# Patient Record
Sex: FEMALE | Race: WHITE | Employment: FULL TIME | URBAN - METROPOLITAN AREA
[De-identification: names, ages, dates, MRNs, and addresses within clinical notes are randomized per-mention and may not be internally consistent; named-entity substitution may affect disease eponyms.]

---

## 2017-01-25 ENCOUNTER — PRE VISIT (OUTPATIENT)
Dept: CARDIOLOGY | Facility: CLINIC | Age: 57
End: 2017-01-25

## 2017-01-25 ENCOUNTER — CARE COORDINATION (OUTPATIENT)
Dept: ONCOLOGY | Facility: CLINIC | Age: 57
End: 2017-01-25

## 2017-01-25 ENCOUNTER — ONCOLOGY VISIT (OUTPATIENT)
Dept: ONCOLOGY | Facility: CLINIC | Age: 57
End: 2017-01-25
Attending: INTERNAL MEDICINE
Payer: MEDICAID

## 2017-01-25 VITALS
HEART RATE: 63 BPM | SYSTOLIC BLOOD PRESSURE: 122 MMHG | TEMPERATURE: 97.8 F | OXYGEN SATURATION: 96 % | DIASTOLIC BLOOD PRESSURE: 68 MMHG | BODY MASS INDEX: 30.3 KG/M2 | HEIGHT: 64 IN | WEIGHT: 177.5 LBS

## 2017-01-25 DIAGNOSIS — R53.82 CHRONIC FATIGUE: Primary | ICD-10-CM

## 2017-01-25 PROCEDURE — 99354 ZZC PROLONGED SERV,OFFICE,1ST HR: CPT | Mod: ZP | Performed by: INTERNAL MEDICINE

## 2017-01-25 PROCEDURE — 99205 OFFICE O/P NEW HI 60 MIN: CPT | Mod: ZP | Performed by: INTERNAL MEDICINE

## 2017-01-25 RX ORDER — CETIRIZINE HYDROCHLORIDE 10 MG/1
TABLET ORAL
COMMUNITY
End: 2017-01-25

## 2017-01-25 RX ORDER — TIZANIDINE HYDROCHLORIDE 2 MG/1
CAPSULE, GELATIN COATED ORAL
COMMUNITY
Start: 2015-01-01 | End: 2017-01-25

## 2017-01-25 RX ORDER — FLUOXETINE 20 MG/1
60 TABLET, FILM COATED ORAL DAILY
COMMUNITY

## 2017-01-25 RX ORDER — ACYCLOVIR 400 MG/1
400 TABLET ORAL
COMMUNITY
Start: 2016-07-08 | End: 2017-01-25

## 2017-01-25 RX ORDER — CETIRIZINE HYDROCHLORIDE 10 MG/1
10 TABLET ORAL PRN
Qty: 30 TABLET | Refills: 0 | COMMUNITY
Start: 2017-01-25

## 2017-01-25 RX ORDER — AMOXICILLIN 250 MG
1-4 CAPSULE ORAL
COMMUNITY
Start: 2014-07-30

## 2017-01-25 RX ORDER — METHOCARBAMOL 750 MG/1
750 TABLET, FILM COATED ORAL
COMMUNITY
Start: 2014-07-30 | End: 2017-01-25

## 2017-01-25 RX ORDER — ACYCLOVIR 400 MG/1
400 TABLET ORAL PRN
Qty: 30 TABLET | Refills: 0 | COMMUNITY
Start: 2017-01-25

## 2017-01-25 RX ORDER — IBUPROFEN 200 MG
800 TABLET ORAL 2 TIMES DAILY
Qty: 100 TABLET | Refills: 0 | COMMUNITY
Start: 2017-01-25

## 2017-01-25 RX ORDER — PSEUDOEPHEDRINE HCL 30 MG
TABLET ORAL
COMMUNITY
End: 2017-01-25

## 2017-01-25 RX ORDER — PSEUDOEPHEDRINE HCL 30 MG
60 TABLET ORAL 2 TIMES DAILY
Qty: 112 TABLET | Refills: 0 | COMMUNITY
Start: 2017-01-25

## 2017-01-25 RX ORDER — IBUPROFEN 200 MG
TABLET ORAL
COMMUNITY
Start: 2000-01-01 | End: 2017-01-25

## 2017-01-25 RX ORDER — TIZANIDINE HYDROCHLORIDE 2 MG/1
2 CAPSULE, GELATIN COATED ORAL AT BEDTIME
Qty: 90 CAPSULE | Refills: 0 | COMMUNITY
Start: 2017-01-25

## 2017-01-25 ASSESSMENT — ENCOUNTER SYMPTOMS
WEIGHT LOSS: 0
HEMOPTYSIS: 0
DECREASED APPETITE: 0
EYE REDNESS: 0
PANIC: 0
CONSTIPATION: 1
JAUNDICE: 0
FATIGUE: 1
NIGHT SWEATS: 0
STIFFNESS: 1
NUMBNESS: 1
TINGLING: 1
BACK PAIN: 1
ARTHRALGIAS: 1
LEG PAIN: 1
BLOOD IN STOOL: 0
RESPIRATORY PAIN: 0
INCREASED ENERGY: 1
POLYPHAGIA: 0
MUSCLE CRAMPS: 0
ALTERED TEMPERATURE REGULATION: 1
TASTE DISTURBANCE: 0
CLAUDICATION: 0
PARALYSIS: 0
SHORTNESS OF BREATH: 1
INSOMNIA: 1
SINUS CONGESTION: 1
TACHYCARDIA: 1
CHILLS: 1
DECREASED CONCENTRATION: 1
SPEECH CHANGE: 0
NAUSEA: 1
DISTURBANCES IN COORDINATION: 1
HOARSE VOICE: 1
SORE THROAT: 0
EYE WATERING: 0
SKIN CHANGES: 1
HYPERTENSION: 0
SINUS PAIN: 1
NERVOUS/ANXIOUS: 1
COUGH: 0
MEMORY LOSS: 1
POSTURAL DYSPNEA: 0
BOWEL INCONTINENCE: 0
HOT FLASHES: 0
HEARTBURN: 1
SLEEP DISTURBANCES DUE TO BREATHING: 0
HEMATURIA: 0
SNORES LOUDLY: 1
POOR WOUND HEALING: 0
LIGHT-HEADEDNESS: 1
WHEEZING: 0
EXERCISE INTOLERANCE: 1
FLANK PAIN: 0
WEIGHT GAIN: 1
HYPOTENSION: 1
EYE IRRITATION: 1
SMELL DISTURBANCE: 0
RECTAL PAIN: 0
DECREASED LIBIDO: 1
SPUTUM PRODUCTION: 0
ABDOMINAL PAIN: 1
LEG SWELLING: 0
PALPITATIONS: 1
DIFFICULTY URINATING: 1
HEADACHES: 1
SEIZURES: 0
COUGH DISTURBING SLEEP: 0
NECK PAIN: 1
BLOATING: 1
JOINT SWELLING: 0
VOMITING: 1
DYSPNEA ON EXERTION: 1
DIZZINESS: 1
NECK MASS: 0
MYALGIAS: 1
HALLUCINATIONS: 0
TROUBLE SWALLOWING: 1
DYSURIA: 0
MUSCLE WEAKNESS: 1
DIARRHEA: 1
WEAKNESS: 1
LOSS OF CONSCIOUSNESS: 0
DEPRESSION: 1
TREMORS: 0
ORTHOPNEA: 0
RECTAL BLEEDING: 0
FEVER: 0
DOUBLE VISION: 1
EYE PAIN: 1
POLYDIPSIA: 0
SYNCOPE: 0

## 2017-01-25 ASSESSMENT — PAIN SCALES - GENERAL: PAINLEVEL: MODERATE PAIN (4)

## 2017-01-25 NOTE — PROGRESS NOTES
"Patient: Laverne Irvin (MRN [id number])   Encounter Date: 2017  Referring: Lucia Christianson M.D. ([address] [address], [phone], fax [phone])  Attending: Spencer Jennings M.D.     Chief Complaint/Reason for Referral: Second opinion regarding possible mast cell activation disease (MCAD).    History of Present Illness: This 56 year old  white A1 medically disabled (on SSDI since 7/15)  is kindly referred in consultation regarding the above-noted issue.  At the beginning of the encounter, I reviewed all available chart data, consisting principally of about 30 notes and a handful of test results in her electronic chart here dating back to , the salient points of which I've incorporated into the narrative below.  The history here is a bit complex, and it's probably best to just present it all chronologically, blending HPI and PMH as follows.  I should note here at the outset, too, that the patient obviously was afflicted by a good bit of cognitive dysfunction and was having a fair bit of trouble with her remote and recent memory.  She kept apologizing for having such difficulty recalling and recounting her history.  She provided a rather \"scattershot\" history, and I've done my best here to reconstruct it into chronological order, but it's possible I might be missing some significant events or have some events out of order.  Her history of chronic multisystem polymorbidity of general themes of inflammation, allergy, and aberrant growth and development in various tissues actually date back to her earliest memories, recalling that she has never been able to sweat and thus can't bask in the sun lest she quickly get \"overheated.\"  She also recalls she has \"always\" (again, dating to her earlier memories) suffered an array of allergies, requiring special soaps and being intolerant " "of make-up and jewelry and a wide assortment of fragrances and chemical odors.  She doesn't recall any other health issues from childhood but knows that migraines started at 13, and when menarche came at 14, she was immediately beset by \"horrible\" dysmenorrhea and menorrhagia, not uncommonly even causing incidents of helga syncope, all of which continued until her first pregnancy.  She also recalls other problems beginning in early to mid adolescence including an unusual extent of exercise-induced fatigue, many episodes of bronchitis (since about 15) and sinusitis (since about 16).  She says she was \"constantly\" tachycardic since some point in adolescence, and an assortment of medications tried for this never helped much.  She also recalls she has been constipated \"forever,\" though in more recent years this has alternated with diarrhea.  She also recalls suffering gastroesophageal reflux disease for many years and simply cannot remember how far back this began.  She knows she suffered an episode of \"toxic shock syndrome\" in high school.  Her first pregnancy came at 27, notable only for prolonged labor and her  daughter having a heart condition of some sort.  Her second pregnancy came at 32 and went smoothly.  Her third pregnancy came at 33 and was miscarried early; no cause was sought or identified.  Her fourth (and final) pregnancy came at 36 and resulted in placenta previa requiring bedrest for many weeks with delivery by emergency  at 30 weeks, and then the  wound dehisced and had to heal by secondary intention.  Soon after this last pregnancy she began developing neck pain, and at 39 a Chiari malformation was diagnosed as the cause of this.  She underwent decompression surgery which did help initially -- she says it was a \"lifesaver,\" as previously she had been \"crippled\" by the pain -- but over time symptoms have slowly relapsed.  Bladder surgery of some sort (possibly a sling?) came " "around age 41, followed by total abdominal hysterectomy and bilateral salpingo-oophorectomy at 47 and resection of a large thigh lipoma a month later.  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  She was seen for this by Dr. Lucia Christianson, who did diagnose EDS3; genetic testing for other forms of EDS and other connective tissue disorders was all negative.  Dr. Christianson also came to suspect the patient might have a mast cell activation syndrome, leading to the present evaluation.  Unfortunately, she did not receive the letter we usually send to patients ahead of their appointments advising cessation of NSAIDs/salicylates and PPIs if possible, and the patient actually has continued taking her regular NSAID and PPI medications right through to this morning.  She says her chief complaint is diffusely migratory pain.    On a full review of systems, although she denies any issues with flushing, epistaxis, easy bleeding, irritation of the nose or mouth or throat, or adenopathy, she endorses a wide range of other, chronic/recurrent, episodic/intermittent and/or waxing/waning issues including subjective (rarely objective) fevers, feeling cold much of the time, constant marked fatigue (often to the point of exhaustion), malaise, headaches, diffusely migratory aching/pain, diffusely migratory pruritus, lifelong anhidrosis, unprovoked fluctuations in weight and appetite, irritation of the eyes, frequent acute brief inability to focus vision, slight hearing loss, tinnitus, easy bruising, \"constant\" sinonasal congestion, occasional coryza, occasional post-nasal drip, occasional proximal dysphagia, dyspnea, occasional palpitations, non-anginal central chest discomfort/pain, gastroesophageal reflux, occasional nausea, rare vomiting, diarrhea alternating with constipation, diffusely migratory " "abdominal discomfort including (usually post-prandial) bloating, urinary frequency and retention, diffusely migratory (but principally just left-sided) weakness, edema largely limited to the left face, diffusely migratory tingling/numbness (typically about the distal extremities), orthostatic and non-orthostatic presyncope, syncope in past as noted above, cognitive dysfunction (particularly memory, concentration, and word-finding), hair loss, deterioration of dentition despite good attention to dental hygiene, brittle nails, diffusely migratory rashes (typically patchy macular erythema), hives (principally just in reaction to certain medications), insomnia, early waking, anxiety, depression, diffuse joint hypermobility, and poor healing in general.  Complete ROS o/w neg.    Family history is notable for a father with chronic obstructive pulmonary disease (he had smoked a long time), atrial fibrillation, congestive heart failure, \"non-stop\" itching (especially about his back, where there also are \"lots of moles\"), easy bruising, \"lots of hernias\" including a hiatal hernia, S/P CABG, bladder cancer, and gastroesophageal reflux disease, a mother with chronic bronchitis and sinusitis, episodes of pneumonia, S/P myocardial infarction, episodes in which her \"throat closes,\" vocal cord polyps (she, too, is a smoker), three strokes, diffuse joint hypermobility, anxiety, tremor, and poor healing, two daughters with Chiari malformation which has been surgically decompressed, a ventricular septal defect in her oldest daughter which had spontaneously closed by age 2, EDS3 and dysmenorrhea and menorraghia in all three of her daughters, spells of anxiety, tremor, dyspnea, and palpitations in her youngest child who also suffers migraines, \"eye convergence issues,\" \"lots of dental problems,\" and who has had some concussions, skeletal injuries and \"never-ending ankle issues\" in her middle daughter, and a brother with HIV. The " "patient has smoked up to 2 packs of cigarettes a day from 18 to the present, but she denies any history of significant alcohol use (in fact, just a slight bit causes nausea and presyncope) or illegal substance use except for some experimentation in college plus frequent marijuana use which, most interestingly, \"helepd me get through high school.\"  She notes she has been referred for a trial of medical marijuana.    She lists her current medications as Flonase prn, Senokot-S prn, fluoxetine 60 mg qd, ibuprofen 800 mg bid, omeprazole 20 mg qd, tizanidine 2 mg qhs, acyclovir 400 mg prn, cetirizine 10 mg prn allergies, vitamin D 1000 units qd, Sudafed 60 mg bid, and Percocet prn.  She notes Dr. Christianson previously had empirically tried her on Zyrtec and Zantac bid, but this did not appear to help any of her symptoms, and she stopped taking them regularly quite some time ago.  She lists her current allergies as severe depression to Chantix, hives to penicillins, itching to codeine, fatigue to Cymbalta, gabapentin, and Lyrica, itching to morphine, nightmares to topiramate, emotional lability to tramadol, and rash to erythromycin.    Exam finds a mildly overweight woman appearing her stated age and in no acute distress, obvious smoker's voice but no odor of smoke about her at the moment, pleasant, cooperative, fully alert and oriented, independently (if just a bit slowly/achily) ambulatory, presenting by herself.  Vitals per chart, notable solely for weight 178 pounds.  Key findings are her comfortable general appearance, HEENT benign but for fair dentition (and, as I often see in EDS3, she can open her mouth to an unusually wide degree), no plethora/pallor/diaphoresis/jaundice/rash/bleeding/bruising, neck supple, no JVD or thyromegaly or carotid bruits, no palpable adenopathy or tenderness at any of the usual node-bearing sites, clear lungs, regular heart (though with curiously soft heart sounds) with no adventitious sounds, " abdomen a bit overweight but otherwise benign, a bit of tenderness on palpation over the mid-thoracic spine, no peripheral edema, neuro grossly intact but for her report of chronic tingling/numbness in the distal left extremities.  On a light scratch test on the upper back, a remarkably minimal degree (compared to what I usually see in MCAD/MCAS) of dermatographism (erythroderma only, no hives) emerged but was fully sustained when last checked 10 minutes later.      Review of available lab data was notable for routine blood counts and chemistries (except for mild hypoproteinemia and hypoalbuminemia) and urinalysis in '15.  Interestingly, the pathology on the hysterectomy noted inflammation at the cervix.  The patient says she had a colonoscopy about five years ago at some facility in the [facility] and knows that some sort of mild abnormality was found, but she doesn't know whether any biopsies were obtained.    A/P: Kudos to Dr. Christianson, because I think she's likely right in her suspicion that a mast cell activation disorder (MCAD) -- and far more likely the far more prevalent (if only recently recognized) type termed mast cell activation syndrome (MCAS) than the rare (but long recognized) type termed mastocytosis -- is at the root of most or all of the patient's chronic multisystem polymorbidity of general themes of inflammation, allergy, and aberrant growth and development in various tissues (though obviously the COPD she has to have to some degree at this point is mostly or all due to her smoking, and it's imperative she stop smoking if for no other reason than that it's a potent mast cell activator). Beyond all the other diseases already ruled out by the great extent of testing she's undergone to date, I don't know of any other human disease that comes anywhere near as close as MCAS does to accounting, directly or indirectly, for the full range and chronicity of all the symptoms and findings here. (Of note,  too, I'm not saying that any of her prior diagnoses are wrong. It's just that each of them accounts for only one subset or another of all that's gone on in her, while MCAS can account for most or all of everything that's been going on in her.) All of that said, she needs objective laboratory evidence of improperly behaving mast cells, toward which end I ordered the range of testing I typically check in assessing for MCAS, namely, a CBCD, CMP, magnesium, chilled serum tryptase and chromogranin A, chilled plasma prostaglandin D2 and histamine and heparin, chilled random urinary prostaglandin D2 and N-methylhistamine and leukotriene E4 and 2,3-dinor 11-beta-prostaglandin-F2-alpha, and chilled 24-hour urinary prostaglandin D2 and N-methylhistamine and leukotriene E4 and 2,3-dinor 11-beta-prostaglandin-F2-alpha. I also ordered an anti-IgE IgG and an anti-IgE receptor antibody, which won't be useful diagnostically but may influence certain therapeutic decisions down the road if MCAS is proven. However, since she has been using her ibuprofen and omeprazole up to this morning, I asked her to stop these drugs (and any other NSAIDs, salicylates, and PPIs) if possible and return in a week to provide blood and urine samples.  We provided her careful written and verbal instructions regarding the 24-hour urine collection including the importance of continuous chilling of the specimen throughout collection and transport.  She understands the various reasons why a single round of  testing might yield all negative results, and she understands that if this happens, it of course doesn't even begin to invalidate/refute/negate even a single element of her history (which strongly argues for a mast cell disorder). Her history is what it is, so if we get a negative round of testing, I'll simply recommend repeat testing (which she'll of course need to pursue locally), albeit with specimen collection at that point preferably deferred  "to a particularly symptomatic day. I also asked her to work with our clinic nurse coordinator, Kayleen Carrington RN, to try to arrange for her old colonic biopsies (if any) to be reassessed (either locally or here) with  staining (and we'll do the same here for the resected cervical tissue which showed inflammation), as mast cells can't be seen with routine H&E staining and I've often seen \"normal\" or \"mildly chronically inflamed\" GI tract biopsies in MCAS patients \"light up\" on  staining revealing mast cell disease. If 2-3 rounds of blood and urine testing yields all negative results, and if restaining of any available old biopsies either can't be done for some reason or yields negative results, the \"backup plan\" will be to pursue a fresh set of bidirectional endoscopies to obtain biopsies throughout the luminal GI tract for pathologic evaluation specifically (using  staining at a minimum) for increased (>20/hpf) numbers of mast cells (as often seen mildly-moderately in MCAS, though not to anywhere near the degree (or patterns) seen in mastocytosis).    Although I cautioned her that she doesn't have a definitive diagnosis of MCAS yet, we did engage in extended discussion today about general aspects of MCAS including its essential nature of chronic multisystem polymorbidity of a generally inflammatory theme, the availability of many therapies shown helpful in various MCAD/MCAS patients, the good likelihood of (eventually) finding therapy that will help her achieve the goal of feeling significantly better than the pre-treatment baseline the majority of the time, and the present frustrating absence of any biomarkers that can help predict which of the many available, potentially helpful therapies is most likely to benefit the individual patient. She understands that if we are able to secure a diagnosis of MCAS, she will be dependent on pursuing -- in patient, persistent, and methodical fashion, " "trying as hard as possible to make just one change at a time -- trials of the various therapies (which, again, lacking predictive biomarkers, we generally pursue in order of escalating cost). She understands that once all test results are available about a month from now, I will write an addendum to this note (to again be distributed to all of her physicians listed below) providing my interpretation of the results and recommendations for next steps.    Given that she did not appear to be in any particular distress and seemed to be somewhat nonchalant regarding her chronic pain (probably because of just how chronic a problem it is and the fact that her ibuprofen and other analgesics currently keep it at a manageable/tolerable level), and given that she appears to have already failed a trial of at least one combination of H1 and H2 antihistamines bid, I did not recommend any other empiric therapy for her today, though if we do confirm the diagnosis, the first order of therapeutic business here will be to have her try the other non-sedating H1 blockers as well as at least one other H2 blocker.  Some patients find that alternative H1 or H2 blockers serve them better, some patients find that tid dosing serves them better than bid dosing, and some patients find that slightly higher dosages (e.g., 20-30 mg rather than 10 mg of cetirizine, or 150 mg rather than 75 mg of ranitidine) serve them better than lower dosages.  Eventually, she will need to \"experiment,\" taking 2-4 weeks with each specific combination of drug/dose/frequency to understand what it can accomplish for her in controlling this disease of naturally waxing/waning intensity. If we find she has MCAS, we will also need to caution her that MCAD/MCAS patients have quite a predilection to adversely react not necessarily to the active ingredients in their medications but rather to the excipients (fillers, binders, dyes, preservatives, etc.) in their medications. " "As such, if she experiences an adverse reaction very shortly after beginning an ordinarily well tolerated medication (as is certainly the case with the H1 and H2 blockers), excipient reactivity must be suspected and she should promptly work with her pharmacist to review the medication's ingredient list, try to identify the likely offending ingredient, and try one or more alternative formulations of the medication which don't share any of the offending formulation's excipients.    It will be difficult (given that my schedule is now 100% booked for the next year) for me to see her back once the test results are back in a month or so, let alone to manage her through the \"tactical maneuvers\" of trying various medications to gain better control over the disease.  I am aware that Dr. Christianson has been acquiring a good degree of experience and facility with managing MCAS patients through trials of the various therapies known to help them, so we will plan on having the patient principally follow with Dr. Christianson for these \"tactical\" medication trials once a definitive diagnosis has been established.  The patient will next return here in about 2-3 months to discuss her test results and general notions of the disease and the approach to treatment, but I noted to her that since I expect we'll have test results in about 4-5 weeks and I'll send my addendum back to Dr. Christianson at that time, it's quite possible she may already have started treatment with Dr. Christianson by the time she next sees me.  I anticipate that after her next visit here, I'll be seeing the patient roughly annually for \"strategic\" reassessments.    As is usually the case with initial consultations for mast cell disease, this was a long encounter, 100 minutes in total, with 60 minutes spent in counseling. The patient indicated that all of her questions at this time had been answered to her satisfaction, and she expressed appreciation for the time I had given " "her.      Typed, reviewed, and electronically signed by: Spencer Jennings M.D.     DT: 01/25/2017 09:08 PM    cc: Lucia Christianson M.D. ([facility], [address] [place] [id number], [phone], fax [phone])      Addendum (5/20/17): For reasons not clear to me, the patient still has not pursued the diagnostic blood and urine testing for MCAS I had ordered at her initial visit here four months ago.  However, I did receive this past week the results of  staining of some of her old biopsies, and they indeed suggest we're headed in the right direction.    Although the  staining of a hepatic flexure colon polyp obtained in 12/11 (originally read as a tubular adenoma) was unremarkable (10-15 mast cells per high power field; the upper limit of normal in the GI and  tracts for this parameter is considered by most pathologists, in my experience, to be 20/hpf), the  staining of the \"cervix with inflammation and reactive changes\" seen in the hysterectomy specimen from 4/08 showed up to 55 mast cells per high power field.    However, it's not called a mast cell *activation* syndrome for nothing, and I think it will be important to find at least some evidence (i.e., elevated levels in blood and/or urine of mediators relatively specific to the mast cell) of mast cell activation to truly clinch the expected diagnosis here of MCAS.    I will ask my clinic nurse coordinator, Kayleen Carrington RN, to contact the patient to discuss these findings.  Perhaps she has undergone the desired testing elsewhere and already has diagnostic results, but if not, then the testing previously ordered here should still be pursued (again, taking care to attend to all of the previously noted precautions).    I will be happy to write and distribute another addendum once results from the desired tests are all available.    I spent another 20 minutes reviewing " "the above-noted results and writing this addendum and messaging Ms. Carrington, but as none of these activities involved face-to-face time with the patient, no additional billing was submitted.      Typed, reviewed, and electronically signed by: Spencer Jennings M.D.     DT: 05/20/2017 01:25 PM    cc: Lucia Christianson M.D. ([address] [address], [phone], fax [phone])      Addendum (6/26/17): Kudos to Dr. Christianson, as she has been proven right: labs are back and are handily diagnostic.  As is commonly seen in this work-up which necessarily casts a fairly wide net over the range of clinically testable mediators which are relatively specific to the mast cell, most of the tests were normal (or virtually so), but the multiple mast cell  levels detailed below were positive.      Therefore, based on (1) a clinical history highly consistent with chronic/recurrent aberrant mast cell  release, (2) multiple elevated mast cell  levels in 6/17 (random urinary leukotriene E4 163 pg/mg Cr (normal <= 104), 24-hour urinary 2,3-dinor-11-beta-prostaglandin-F2-alpha 5463 pg/mg Cr (normal <= 5205; note, too, the random urinary assessment of this metabolite was *almost* elevated at 5200), 24-hour urinary prostaglandin D2 330 ng/24h (normal 100-280)) (but a normal serum tryptase, largely ruling out systemic mastocytosis), (3) increased mast cells (up to 55/hpf, normal generally thought to be <20) on  staining of the \"cervix with inflammation and reactive changes\" seen in the hysterectomy specimen from 4/08 showed up to 55 mast cells per high power field, (4) absence of any other evident disease better accounting for the full range and chronicity of all the symptoms and findings in the case, and (5) at least partial response to mast-cell-targeted therapy (e.g., antihistamines, NSAIDs, vitamin D), mast cell " "activation syndrome (MCAS; not systemic mastocytosis) is the underlying, unifying diagnosis (per Shantel et al., J Hematol Oncol 2011) for the patient's virtually lifelong complex of multisystem polymorbidity of general themes of inflammation, allergy, and aberrant tissue growth/development.  Of note, again, I don't think any of her prior diagnoses are wrong (other than any assertions of psychosomatism that might have been made along the way), but the diagnosis that seems to best underlie/unify the largest portion (potentially even all) of her large array of past and present issues is MCAS, and therefore treatment efforts directed at such would seem to be warranted.  To be sure, all of her physicians must maintain vigilance for other processes for which -- again, whether ultimately dependent on, or completely independent of, her MCAS -- standard therapies other than mast-cell-directed therapy have been defined, as such standard therapies would of course be the more appropriate choices for such processes.  However, with MCAS now having been defined in this patient per published diagnostic criteria, emergence of a new process that is potentially consistent with MCAS can be reasonably attributed to MCAS once other \"routine\" evaluation for that process has ruled out other, \"traditional\" causes for such a process.      I'd like to briefly address just a bit more why this is MCAS and not mastocytosis. 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 Her normal tryptase, too, is far more consistent with MCAS and makes systemic " "mastocytosis (SM) quite unlikely (not impossible, but quite unlikely). The rare normal-tryptase (or minimally-elevated-tryptase) SM virtually always is the indolent form of SM (ISM), and to the best of our present knowledge, there's no difference in the prognosis of, or the therapeutic approach toward, ISM vs. MCAS. Therefore, even if we're missing the uncommon normal-tryptase ISM by not pursuing more biopsies of various extracutaneous tissues, there would seem to be nothing lost by \"misdiagnosing\" her with MCAS. (There certainly are no clinical or laboratory features here to suggest a comorbid hematologic malignancy.) I'll note, too, that transformation of ISM to aggressive SM (ASM) is rare, and transformation of MCAS to any form of SM has in fact never been reported yet in the time since the first case reports of MCAS were published in '07.      As such, we can reasonably confidently exclude mastocytosis as the issue here and I don't think marrow examination is warranted, and until somebody figures out another diagnosis that even *better* accounts for all that has gone on in her, it seems reasonable to go with MCAS as the best root operating diagnosis here.      What I'd like to do at this point in the discussion is provide a range of general information about MCAS and its therapy.      I will note that it's of course possible that the mast cell activation found in her is purely secondary (to some unknown other chronic inflammatory disease) rather than primary (mutational) -- it will require mutational analysis that won't be available in the clinical laboratory for at least another 5-10 years before such a distinction can be routinely made -- but I will assert, again, that she meets all current diagnostic criteria for MCAS and MCAS is the best unifying explanation at present for her large assortment of problems. As treatments for other (less than unifying) diagnoses have generally not yielded substantial clinical " improvement to date, and since a diagnosis of MCAS has now been made per current diagnostic criteria, it would seem that treatment efforts directed at MCAS are warranted (presuming she is less than wholly satisfied with the gains she has made with her present regimen).      I feel it's important to comment on how a normal tryptase is not necessarily inconsistent with mast cell disease. Since its discovery in the 1980s, tryptase has been inexorably linked with mast cell disease and generations of physicians have been trained that it is virtually impossible to have mast cell disease unless serum tryptase is elevated -- but we now know this precept to be incorrect. Although initial studies of tryptase led to thinking its level reflected the total body mast cell activation state, in the last decade there has been a sea change in this understanding and now it is clearly understood (and even publicly acknowledged by tryptase's discoverer) that the level of tryptase far dominantly reflects the total number of mast cells in the body and far less the total body mast cell activation state. As such, one would expect to find the significantly increased tryptase level typically found in >80% of systemic mastocytosis patients, while in MCAS (where there is little to no increase in the numbers of mast cells) one would expect to find little to no increase in the tryptase level, and when no increase can be found in tryptase, one typically has to find evidence of mast cell activation by examining other relatively specific mast cell mediators, a tricky business given their typically very short half-lives and great thermolability.      Once diagnosis of MCAS is established, there are initial questions/issues about natural history and prognosis.      Although for many reasons a unifying diagnosis of a mast cell disorder typically isn't suspected until decades after symptom onset, the chronic multisystem polymorbidity of general themes  "of inflammation   allergy   aberrant tissue growth/development that is MCAS tends to initially emerge by adolescence or early adulthood but often happens as early as childhood or even infancy, this last a scenario in which the rare inborn autoinflammatory syndromes (AISs) need to be considered in the differential diagnosis, especially since (1) recent molecular investigations in the AIS area have been discovering that some of them actually are specific variants of MCAS and (2) highly specific (and effective) drugs exist for some of these syndromes. MCAS tends to \"step up\" its baseline symptoms at irregular intervals, generally shortly after a major (physical and/or psychological) stressor (e.g., trauma, major infection, surgery, distant travel with novel antigenic exposure, job loss or other severe financial strain, death of a loved one, etc. etc. etc.). In the absence of formal study yet of this issue, present best estimates of survival in MCAS are for a normal lifespan (akin to what's been shown in studies of indolent SM) and that a wide variety of medications have been shown effective in various MCAS patients. Although there appears to be a very low rate of transformation of indolent SM to more aggressive forms of SM, there has not yet been a single reported case (nor I have observed or heard of a single case) of MCAS transforming to any form of mastocytosis. At present, with no objective markers of therapeutic response having been developed yet (and which may be quite difficult to develop given the extreme heterogeneity of the clinical presentation of the disease), the goal of therapy is entirely subjective, namely, feeling significantly better than the pre-treatment baseline the majority of the time. Feeling \"perfect\" or feeling significantly better \"all the time\" is not a reasonable goal given the complexity of the disease (see [email or web address].com/index.php/page/copelibrary?key=mast%20cells as one " illustration of this point). Most MCAS patients are able to eventually identify significantly helpful therapy, but at present there are no published/validated biomarkers that can predict which therapies are most likely to benefit a given patient. As such, both patient and doctor must practice patience, persistence, and a methodical approach -- trying as hard as possible to make just one change in the regimen at a time -- in stepping through trials of various medications (generally proceeding in order of increasing cost given that no better scientifically informed strategy is presently available), retaining treatments which clearly provide significant benefit (which for most medications in this area can be determined within 1-2 months) and decisively stopping those which do not meet this high bar, lest unmanageable polypharmacy develop.      In my opinion, her early history was not burdened with multisystem inflammatory issues nearly as much as one typically sees in classic autoinflammatory syndromes, so I don't think it would be reasonable to pursue genetic testing for such at this time.  Of course, if she proves refractory to a number of MCAS-targeted therapies, the utility of such testing might then be increased.  Initial evaluation by a genetic counselor usually smooths the way toward insurance coverage of the testing for these conditions (e.g., the periodic fever syndrome 7-gene sequencing panel (code PRFEVERPAN) at [facility] in [place] ([email or web address]) or the similar (periodic fever syndrome) 21-gene sequencing panel at [facility] (https://[email or web address] Notes)).      I think her normal plasma histamine and urinary N-methylhistamine levels make it pretty unlikely there's a deficiency in diamine oxidase (LUDY) or a poor-metabolizing mutation of histidine N-methyltransferase (HNMT) here, and thus I think testing for LUDY deficiency and mutational analysis of HNMT is not warranted.  Nevertheless, if  these tests are desired, LUDY deficiency testing can be pursued via [facility] in [place], [place] ([email or web address]), and HNMT mutational analysis can be ordered as a single-gene analysis at various facilities (e.g., [facility] in [place], [place] ([email or web address])).      Current understanding of the genetics in MCAS is limited. Repeated preliminary datasets from the [facility] demonstrate the disease is clonal in essentially every patient, albeit vastly heterogeneously so, thereby likely explaining the vast clinical heterogeneity (and vastly heterogeneous therapeutic responsiveness) with which the disease presents. The [facility] team has also provided repeated preliminary datasets demonstrating the disease is likely epidemic (present in at least 5-10% of the general [place] population), unsurprising given increasing data suggesting various epidemic chronic idiopathic inflammatory diseases (e.g., chronic fatigue syndrome, fibromyalgia, irritable bowel syndrome) may well be merely assorted variants of MCAS. Other not-so-obviously-inflammatory (but nevertheless still likely inflammatory) diseases, too, may be assorted variants of MCAS, such as chronic idiopathic urticaria, idiopathic anaphylaxis, Nalini Danlos Syndrome Type III, postural orthostatic tachycardia syndrome (POTS), dysautonomia, autism, attention deficit hyperactivity disorder, etc. etc. etc. However, to be clear, none of these diseases has yet been proven to be forms of MCAS, and much research into this hypothesis is needed in each of these diseases. Furthermore, the [facility] team has clearly demonstrated the high familial predisposition of the disease in spite of the fact that the  mutations appear virtually always somatic/acquired (i.e., not germline/inherited), and relatively early in life at that. (It is known that the mutations driving aberrant MC function in any given affected patient in an affected  "swathi are different from the mutations in other affected members of the affected swathi, explaining why the different affected members of an affected swathi manifest somewhat different clinical presentations. The epigenetic effect of \"anticipation\" is also seen in mast cell disease kindreds, with later generations first manifesting the disease at earlier ages and with overall more severe phenotypes in later generations.) Preliminary data are just beginning to emerge that the reconciliation of these two superficially conflicting observations (familial predisposition vs. the somatic nature of the causative mutations) stems from recognition that most MCAS (and SM) patients also bear (inheritable) epigenetic mutations, and it is currently hypothesized that these epigenetic mutations create states of genetic fragility such that assorted biochemical signal patterns which flood the body in response to assorted (physical or psychological) stressors interact with such fragility states to create the actual genetic mutations in marrow stem cells or pluripotent progenitor cells which then \"trickle down\" to mast cells (and, to be sure, other lineages, but when the end clinical effects of such mutations are dominantly operant in the mast cell as opposed to other types of cells, it is reasonable to term the situation a mast cell activation syndrome). These genetic mutations then create states of not only constitutive mast cell activation but also aberrant mast cell reactivity, and the end clinical effects seen in any given MCAS patient are the net results of extremely complex networks of interactions among abnormal (and normal) MCs and abnormal (and normal) other cells.      As previously noted, there is a large array of drugs shown helpful in various MCAS patients, but \"Step 1\" in treating any mast cell disorder -- and I cannot overemphasize the importance of this step -- is identification (as specifically as possible) and " "avoidance of triggers (be they chemical/antigenic or physical such as cold, heat, ultraviolet light, etc.). Note medication excipients (e.g., fillers, binders, dyes, preservatives) often are triggers, and rapid adverse reaction to an ordinarily well tolerated drug in an MCAS patient is less likely a sign of intolerance of the active ingredient and far more likely a sign of an excipient reaction mandating prompt return to the pharmacist (commercial or compounding) to identify alternative formulations to be tried which do not contain any of the offending formulation's excipients. Adding a drug to the allergy list is unhelpful -- indeed, frankly counterproductive -- when the true offending agent is an excipient in the particular formulations of the drug that were tried. Offending excipients should be identified as specifically as possible, added to the allergy list, screened against the rest of the patient's present medication list, and screened against all medications prescribed in the future.    I should specifically note here that her detailed medication history indeed suggests that a number of adverse medication reactions have been excipient-directed rather than drug-directed, so consultation with her pharmacist, trying to identify the specific offending excipient(s) as described above, will be an important early maneuver for her.      \"Step 2\" in treating MCAS ordinarily is identifying an optimal full (H1 + H2) histamine receptor blockade as detailed in my original note above.  Most MCAS patients do much better with histamine receptor blockers given at least twice daily rather than once daily; some do even better with tid dosing rather than bid dosing. Some patients clearly do better with one particular H1 blocker compared to another, or one particular H2 blocker compared to another. Thus, experimentation with different H1 and H2 blockers is reasonable to try to identify a particular optimal regimen. With the " antihistamines, it usually takes only 2-4 weeks with each particular H1 blocker (at any given dose/frequency) or H2 blocker to identify (across the natural hourly/daily/weekly waxing/waning of symptoms from inappropriate mast cell activation) the benefits and toxicities of that specific regimen, allowing a decision at that point as to which dosing (or medication) change should be tried next. These drugs ordinarily are so well tolerated that if adverse reactions rapidly emerge, excipient reactivity is far more likely than reactivity against the drug molecule itself. In the [place] non-sedating H1 blockers that are commonly tried are loratadine (starting at 10 mg bid), cetirizine (starting at 10 mg bid), fexofenadine (starting at  mg bid), or levocetirizine (starting at 5 mg bid). H2 blockers that are commonly tried are famotidine (20-40 mg bid) and ranitidine ( mg bid); in much of [place] and certain other regions, rupatadine has been demonstrated to be a useful H1 blocker in mast cell disease. Through cautious experimentation, some MCAS patients discover that higher individual dosages (e.g., loratadine 20-30 mg instead of 10 mg) or higher dosing frequencies (e.g., tid instead of bid) bring them significantly greater benefit than lower dosages or frequencies. The patient who is taking too much H1 blocker almost always discovers that dosing is excessive when the typical anticholinergic toxicities are noted: newly (or significantly worse) dry eyes, dry sinuses, dry mouth, dry throat, urinary hesitancy, and/or constipation.      A few MCAS patients are so severely afflicted as to be in an essentially constant anaphylactoid state. Although I don't think this patient is anywhere close to the point of needing this treatment, I will note -- merely for *potential* future use in this patient, should she advance to the point of suffering severe, chronically life-threatening and/or disabling disease proving refractory  to a large number of other treatments -- that I have had success in some (now more than two dozen) very severely afflicted MCAS patients with continuous infusion of (preferably preservative-free) diphenhydramine. I typically start dosing (inpatient, to permit close monitoring) at 5 mg/hr and escalate in increments of 1-2 mg/hr with each flare of symptoms. Dosing above 15 mg/hr is likely to be futile and toxic, but I have now seen (though not yet had opportunity to publish beyond abstract form) excellent responses in the large majority (~90%) of a bit more than two dozen patients in whom I've now tried this, and all the responses have occurred in the 10-14 mg/hr range. Obviously, a semipermanent IV line (typically PICC, PASport, or Portacath) needs to be placed prior to discharge if this treatment is found helpful; note that some patients react to the materials in some lines, so sometimes more than one line needs to be tried. Even once an optimal chronic maintenance dose is identified, patients may continue to have occasional flares, for which bolus dosings of 10-25 mg via the pump are usually sufficient to regain control. Of note, in one patient in whom the infusion of preservative-free IV diphenhydramine seemed to trigger flares and who could not tolerate the infusion at more than 8 mg/hr, a trial of a different 's preservative-free IV diphenhydramine instantly resolved the intolerability and resulted in good response within the expected dosing window, thus demonstrating there had to have been an unacknowledged excipient or unrecognized contaminant in the first product tried; indeed, I then discovered at the FDA's website that the first product's  (ANKITA) had been repeatedly recently cited by the FDA for contaminated product and inactive product in some of its other IV products and also had been cited for failing to respond to prior citations. (I have since gained similar experience with the  allegedly preservative-free IV diphenhydramine product from [facility], too. In all of these few, severely afflicted patients who failed [facility] IV diphenhydramine, a trial of similar product from [facility] then proved successful.) In other words, although it is as theoretically possible to develop true allergy to diphenhydramine as to any other medication, ordinarily diphenhydramine is an extremely well tolerated drug, and thus even in a formulation which supposedly contains absolutely nothing but diphenhydramine and water, intolerance should raise more concern for excipient reactivity than diphenhydramine reactivity.      I will further note that I also have (unpublished, limited) clinical experience that addition of continuous famotidine infusion (2.5 mg/hr) to continuous diphenhydramine infusion (CDI) can provide clinically significant further disease control beyond what is seen in some patients with CDI together with intermittent (oral or IV) histamine H2 receptor blocker therapy.  Still, if it is ever determined that CDI is necessary in this patient, I would recommend starting it first and establishing a stable, beneficial dose before adding continuous famotidine (or ranitidine), so that the different benefits from the different infusions can be distinguished.      Once an optimal full histamine receptor blockade has been established (presuming such drugs help at all; note that the heterogeneity of the disease is such that one can't presume *any* mast-cell-targeted medication or medication class to necessarily prove effective in all MCAS patients), the question becomes what to try next. Given the lack of validated biomarkers at present to predict optimal therapy for the individual MCAS patient (see the note in the following paragraph for a bit more discussion on this important point), I tend to start inexpensively and progress by cost as needed (though with occasional, carefully considered exceptions as  noted below). Whenever possible, one intervention should be tried at a time, generally starting at a low dose and escalating step-wise in dose or frequency about every 1-2 weeks as tolerated so that a maximally tolerated dose and a maximally effective dose and frequency can be clearly identified. If the intervention is tolerated but significant benefit is not clearly identified after 4-8 weeks, the drug should be dropped and the patient should proceed to the next trial. When the medication that is significantly effective for a patient's particular variant of mast cell disease is found, the improvement is often so starkly apparent to the physician as he walks into the exam room at the next check-up in 1-2 months that sometimes words do not even need to be exchanged.      There is an understandable temptation to expect that elevated prostaglandins should ivy for likelihood to respond to NSAIDs, or that elevated leukotrienes should ivy for likelihood to respond to leukotriene receptor antagonists, or that elevated histamine or histamine metabolites should ivy for likelihood to respond to histamine receptor antagonists and/or diamine oxidase, etc. etc. etc., but the reality is that at present there simply are no data demonstrating such relationships. The mast cell is capable of producing and releasing more than 200 mediators, and the few we measure obviously are a very poor surrogate for the totality of the signalling chaos in the disease, plus, as noted above, there are preliminary data suggesting extreme mutational heterogeneity in multiple mast cell regulatory elements in essentially every patient with the disease, all but guaranteeing extreme heterogeneity in patterns of clinical presentation and patterns of therapeutic responsiveness.  Thus, while there's certainly nothing wrong in trying, for example, a leukotriene blocker as an early intervention in an MCAS patient with elevated leukotrienes, one should not  "draw any inferences at all (about, for example, the accuracy of the diagnosis) if the patient fails to respond to such \"logical\" therapy. The disease is simply far too complex for expectations of response to be that simple. Similarly, I should emphasize that the anecdotal observations I offer below (e.g., patients with diffusely migratory pain, especially bone pain in the legs, tend to respond to hydroxyurea, and patients with inappropriately \"normal,\" or even mildly elevated, H/H tend to respond to imatinib) are just that -- anecdotal -- and have not yet been published or otherwise subject to peer review, let alone put through formal evaluation to establish them as \"validated biomarkers.\" All I can offer at this point is my accumulated clinical experience in treating more than 1,000 MCAD patients (the vast majority with MCAS). This certainly will be important research to be done as funding for such can be obtained, but the bottom line at present is that research has not been done, so the physician treating an MCAS patient needs to keep in mind the great limitations on what's currently *reliably* known about MCAS.      On a matter that's different from, but nevertheless somewhat related to, the excipient issue, I should note that if the patient has any known drug-metabolizing-enzyme (DME) polymorphisms (e.g., in cytochrome p450 isozymes 2C9, 2C19, 2D6, or 3A4, all of which can be genotypically tested quite readily in the [place] dosing adjustments will be needed as appropriate for the patient's particular polymorphisms. Like MCAS itself, pharmacogenomic polymorphisms are far more prevalent (by available epidemiologic data) than physicians typically suspect. A poor-metabolizing DME polymorphism should be suspected (and the patient should be appropriately genotyped) if, after the patient has had some time on the drug at routine doses, a toxicity develops which is typically seen only at high doses of that drug; " "conversely, a rapid-metabolizing DME polymorphism should be suspected (and, again, the patient should be appropriately genotyped) if an ordinarily very reliable drug effect (e.g., INR increase with warfarin) is not seen with typical dosing. It is unknown whether there is a relationship between the genetic/epigenetic origins of mast cell disease and the genetic basis of DME polymorphisms, but I certainly have seen many DME polymorphisms in MCAS patients.     Significantly beneficial drugs obviously should be retained in the regimen beyond the trial period. There are no biomarkers at present to identify when the disease has come \"adequately\" under control. It is purely the patient's subjective judgment as to whether sufficient improvement has been attained to preclude, at least for the time being, further medication trials, or not. The goal in this very complex disease is to identify a regimen that helps the patient feel significantly better than the pre-treatment baseline the majority of the time, and with sufficient patience, persistence, and a methodical approach (trying as hard as possible to make just one change in the regimen at a time) exercised by both patient and local treating physician, in my experience most MCAS patients have been able to attain the goal.      Other inexpensive agents to be considered include potentially sedating H1 blockers (e.g., hydroxyzine, doxepin, cyproheptadine, clemastine), NSAIDs (note caveats below), quercetin or other flavonoids, alpha lipoic acid, N-acetylcysteine, vitamin C, vitamin D, benzodiazepines, and even amphetamines and low-dose naltrexone; LUDY supplements, too, occasionally provide some help.  More expensive agents include leukotriene inhibitors, ketotifen (this is more expensive only in the [place] it usually is very inexpensive in every other country on the planet), cromolyn, pentosan, ivabradine, dronabinol, and hydroxyurea. Like ketotifen, there is another " "mast-cell-stabilizing/anti-inflammatory agent, too -- tranilast -- readily available in the [place] but only available in the [place] via compounding. Substantially more expensive agents include omalizumab and tyrosine kinase inhibitors such as imatinib. I also have seen somatostatin occasionally prove helpful for refractory non-infectious diarrhea in MCAS.  Although there is not yet any reported use of alpha interferon in MCAS, the systemic mastocytosis literature shows that this drug can help reduce, in some patients, not only tumor load but also mast cell activation symptoms, therefore arguing for potential utility of the drug in MCAS (more comments below). Immunosuppressants such as hydroxychloroquine, azathioprine, cyclosporine, rapamycin, sirolimus, tacrolimus, mycophenolate, cyclophosphamide, etc. tend to help little but occasionally show benefit and thus are not entirely unreasonable to try; dosing is individualized, but it is reasonable to start as is typically done in other situations in which these drugs are used. There are theoretical reasons why newer targeted anti-inflammatories (e.g., infliximab, etanercept, adalimumab, etc.) and Janus kinase (SELENA) inhibitors might be helpful in at least some cases of MCAS, but there have not yet been any reports of ad hoc use of such drugs in MCAS (except for tofacitinib, as detailed below), let alone formal studies. Of note, too, are the (expensive) NK-1 receptor blockers (e.g., aprepitant), which are approved for refractory post-operative or chemotherapy-induced nausea and vomiting but have not yet been case-reported or formally investigated (let alone FDA-approved) in \"mast cell disease,\" yet they have been shown helpful in \"refractory pruritus\" of both malignant and benign/idiopathic etiologies, and one could be forgiven, in my opinion, for suspecting \"refractory pruritus\" is likely a manifestation of mast cell activation; it's interesting that binding of substance " "P ligand to the NK-1 receptor (which is known to be expressed on the mast cell surface) is known to cause explosive mast cell degranulation.  Again, there simply is no way to predict which medications are most likely to help which MCAS patients, and it is the treating physician's best judgment as to the specific sequence of medication trials that will best suit the individual patient. There are no standards established in this matter, and there is no \"right\" or \"wrong\" to trying one medication sooner rather than later.      I typically start a trial of doxepin in an MCAS patient at 6.25-10 mg bid, escalating weekly as tolerated (sedation is usually the limiting toxicity) in 6.25-10 mg bid steps to maximal efficacy or a maximum dose of about 40-50 mg bid. Hydroxyzine can be tried starting at 10-25 mg bid and escalating every 1-2 weeks in steps to  mg bid-tid. Cyproheptadine can be tried starting at 4 mg bid-tid and escalating every 1-2 weeks in steps; maximum dosing typically is 8 mg qid.  Clemastine (which is usually accessible over-the-counter) can be tried starting at 1.34 mg bid, escalating weekly as tolerated in steps of 1.34 mg bid to maximal efficacy or a maximum dose of 2.68 mg bid-tid.      Stimulants such as Adderall (amphetamine-dextroamphetamine, or analogs such as lisdexamfetamine) or Ritalin (methylphenidate) are occasionally helpful. Adderall can be started at 5 mg once or twice daily and escalated in steps to as high as a total daily dose of 60 mg. It probably should not be used in patients with a history of substance abuse. Ephedrine starting at 12.5 mg bid may be helpful; it can be stepped up every week or so as tolerated to as high as 150 mg daily in divided doses. Methylphenidate can be tried at 5 mg bid and escalated in steps every 1-2 weeks as tolerated to maximal efficacy or a maximum dose of 20 mg bid-tid (optimally 30-60 minutes before meals); if the drug proves helpful, long-acting " (and thus potentially more convenient) formulations are available.      Narcotics often are triggers in MCAS. I try to avoid prescribing narcotics for MCAS patients as much as possible. In my experience and as reported in some literature, tramadol, fentanyl, and hydromorphone tend to be better tolerated than other narcotics. I have very limited anecdotal experience, too, that buprenorphine (e.g., the Butrans patch) can be well tolerated and helpful.      NSAIDs can be helpful in some mast cell disease patients but serve as triggers in others. If there is any history of NSAID intolerance, then consideration must be given to having an allergist take the patient through an NSAID desensitization protocol prior to starting a trial of NSAIDs. The NSAID most commonly used in NSAID-tolerant and -responsive MCAS patients is simple aspirin, typically beginning cautiously at 40-80 mg bid and doubling, as tolerated, approximately every 4-14 days to maximal efficacy. I would not push this past 500-650 mg bid, as I have reliably seen intolerable toxicities at total daily doses in excess of 1300 mg/d. Maintenance of 325-650 mg bid dosing requires standing GI prophylaxis in the form of either H2 blocking and/or PPI therapy. I should note, too, that I have seen [facility]AS patients in whom all standard COX1/COX2-blocking NSAIDs are intolerable but who then tolerate COX2-selective celecoxib (typically 100-300 mg bid) quite well and to good effect. That said, celecoxib has a sulfa moiety and traditional teaching had long been that this drug should be avoided in sulfa-allergic patients, though more recent published experience (e.g., http://www.nejm.org/doi/full/10.1056/GAAQxb381866) suggests this is an insignificant consideration and it's OK to try celecoxib in sulfa-allergic patients.      Quercetin, a flavonoid, can be obtained over-the-counter and is typically started at 250-500 mg bid and escalated in dose or frequency every 1-2 weeks as  tolerated in steps of 250-500 mg bid to maximal efficacy or a maximum dose of about 1000 mg tid. If there are hints of a response, a more water-soluble (and thus better absorbed and sometimes more effective) form of this drug, quercetin chalcone, can be tried (typically at about half the dose of quercetin).      Alpha lipoic acid is typically started at 100-300 mg bid and escalated every 1-2 weeks as tolerated to maximal efficacy or a maximum dose of about 300-600 mg bid. In my experience this has tended to benefit sensory neuropathy more than other symptoms, but I also have seen an occasional case in which it provided lafleur global improvement.      N-acetylcysteine is typically started at 300-600 mg bid and escalated every 1-2 weeks as tolerated to maximal efficacy or a maximum dose of about 600-1200 mg bid. In my experience this benefits few patients (most commonly those with presyncopal issues), but sometimes its benefits are global and lafleur.      Vitamin C has been repeatedly shown to inhibit mast cell production and release of histamine. It's typically dosed at 750-1000 mg in a once-daily slow/extended-release form, though I've had one patient respond well at just 500 mg once daily, and some patients report use of such a product bid helps more and appears sustainably tolerable and effective.      Although vitamin D has not yet been reported to have helped any form of human mast cell disease, I will note that a few of my [facility]AS patients who have been prescribed vitamin D supplements by their other physicians to address osteopenia/osteoporosis have reported improvement (soon after beginning the supplement) in symptoms which almost certainly are driven by their MCAS, and a potential direct mechanism for such a response is clear since (normal and malignant) mast cells are known to bear cell-surface vitamin D receptors and since engagement of that receptor by vitamin D (calcitriol) clearly results in activation  "of various intracellular pathways that result in decreased inflammation as shown by a number of measures. As such, and again with the understanding that there are no formal studies of such, vitamin D supplementation in moderation (~1,000 IU per day) would seem to be a not unreasonable, and almost certainly non-toxic (excipient issues notwithstanding) and inexpensive, intervention to try in MCAS, especially in those in whom vitamin D deficiency is identified. Of note, patients who are severely deficient in vitamin D at baseline probably initially need more aggressive supplementation (e.g., ~50,000 IU weekly for 6-8 weeks) before pursuing the above-noted daily supplementation.      I have heard of mast cell disease patients who have improved with low-dose naltrexone but have seen significant improvement in only one patient thus far. Dosing typically is in the range of 1.5-6 mg/d, though some patients may find split dosing bid more helpful.      LUDY supplements are occasionally helpful and are typically dosed in terms of histamine-degrading units (HDUs), e.g., the HistDAO product is dosed as 20,000-40,000 HDUs (1-2 capsules), preferably 15 minutes before meals, especially meals with known higher histamine content.      Speaking of meals, it should be noted that some patients find \"low-histamine diets\" (the specific nature of which seems to vary substantially from one author to the next) helpful, other patients find other diets helpful, and most patients find diets of any sort generally unhelpful.  In general, dietary interventions should be viewed as exercises attending to \"Step 1\" above, i.e., identify triggers as precisely as possible, and then avoid them.  In other words, if a high-histamine-content food such as cheese or wine is found to reliably trigger a flare of symptoms, then that food should be avoided.  All one can do with regard to diet is avoid extreme diets and consider each dietary intervention as, well, " "an intervention akin to a medication intervention: if it has not clearly provided significant benefit after about a month, it should be abandoned and the patient should move on to another intervention/trial.      Also with regard to dietary challenges, more severely afflicted MCAD/MCAS patients sometimes have so much difficulty tolerating a very wide range of foods that \"safe\" oral intake is reduced to an extremely limited dietary range, and sometimes it unfortunately becomes the case that simply no oral dietary intake at all is tolerated. In such patients, it is important to remember that not every dysfunctional mast cell in the body of an MCAS patient is dysfunctional in the same way as every other dysfunctional mast cell in that patient's body, and it often is the case that mast cells in different sites in the patient's body -- even in different segments of a given organ/system -- will be dysfunctional in different ways or degrees. As such, even though the mast cells in the proximal GI tract (mouth, throat, esophagus, perhaps even stomach) may be so widely reactive as to preclude tolerable ingestion of any dietary material, it remains possible that the mast cells in the small and large bowel may still tolerate dietary material and permit absorption of nutrients. In other words, in some MCAS patients who simply can no longer eat, a PEG, PEJ, or PEG-J tube may permit continued enteral feeding, a far better approach to nutrition than parenteral nutrition (which indeed I have seen become necessary in a very few MCAS patients). Although I have seen occasional patients unfortunately react to such tubes themselves (requiring replacement with tubes constructed of alternative materials/plastics), in my experience most MCAS patients who come to require tube feeding adequately tolerate the tube (and the placement procedure), and then the question becomes which formula to use. I have seen a wide range of formulas prove " "successful -- and unsuccessful -- in MCAS patients, and only a patient, trial-and-error process will prove successful in the end (though, again, a very occasional patient proves intolerant of all available/accessible formulas and thus comes to need parenteral nutrition, with the expected much higher complication rate). In my experience, Neocate Jr. and Elecare Jr. have been the most consistently (but, again, not universally) tolerable formulas.      Benzodiazepines -- typically at low doses but given regularly because of the short half-lives of most of the drugs in this class -- also can be helpful, likely because they address the inhibitory mast-cell-surface benzodiazepine receptors. (Of course, they also address similar neuronal receptors, and given the physical proximity of mast cells and neurons in many tissues and the extensive \"cross-talk\" between these two types of cells, it should not be surprising that \"calming the nerves\" can bring about a useful downregulating effect on mast cells independent of whatever direct downregulating effect such a drug might have on mast cells via the mast-cell-surface benzodiazepine receptor.) Lorazepam is a reasonable choice, and even though clonazepam clearly binds to the mast-cell-surface benzodiazepine receptor less well than lorazepam, some patients respond well to clonazepam, too, but these drugs have short half-lives, so any benefit she gains from them will wear off fairly shortly, thus justifying regular dosing if it is going to provide her as much help as possible. (It is for this pharmacokinetic reason that some MCAS patients who benefit from lorazepam or clonazepam clearly do better at tid dosing than bid dosing.) I typically start lorazepam or clonazepam dosing at 0.25 mg bid, escalating every 1-2 weeks as tolerated in 0.25 mg bid increments to maximal efficacy or a maximum dose of 1-1.5 mg bid-tid. An occasional patient will even do remarkably well at just 0.125 mg " bid, so there's nothing wrong with starting at even that cautious a dose. Sometimes diazepam, because of its longer half-life, comes to be identified as the preferred agent of this class in the individual patient. Midazolam is usually an excellent choice for perioperative management.      Steroids of course are inexpensive and often helpful in settling acute flares of mast cell disease, but their long-term toxicities make them unattractive as chronic treatment. Of note, though it's virtually impossible for a steroid to be allergenic (just as with H1 and H2 blockers as discussed above), non-IV steroid injections (as given in painful joints, for example) often contain excipients and/or -ina anesthetics which indeed can be provocative to MCAS patients. The full ingredient list of any steroid injection should be carefully reviewed in advance -- and then re-reviewed if a reaction develops.      The mast cell's  repertoire includes a number of leukotriene moieties, and just as one can't predict which benefits might be seen with other medications tried in this disease, one shouldn't assume that there can't be any benefits beyond the respiratory tract from this traditionally asthma-targeted drug. The leukotriene receptor blocker that's usually tried first is montelukast. In my experience, MCAS patients who respond to montelukast usually respond better to twice-daily dosing (10 mg bid) than once-daily dosing; occasional patients respond even better at 20 mg bid.  I have not seen more benefit with dosing of the leukotriene receptor blockers more frequently than bid. (Some patients clearly do better with brand-name Singulair vs. generic montelukast, or vice versa, likely due more to excipient issues than anything else.) Leukotriene synthesis inhibition can be tried, too, with zileuton, which has a short half-life such that 600 mg qid dosing generally is recommended over 1200 mg bid dosing. However, an  extended-release formulation of zileuton is now available; dosing is 1200 mg bid. Liver function tests should be checked at baseline and then monitored monthly in the first quarter and then quarterly when starting zileuton.      Ketotifen is available very cheaply in every country on the planet except the [place] where it is available in oral form only via compounding, which of course incurs substantial cost. It is commonly started at 1 mg bid, escalating every 1-2 weeks as tolerated in steps of 1 mg bid to maximal efficacy or a maximum dose of 4-6 mg bid-tid. Ketotifen eyedrops can be helpful for the eye irritation frequently seen in MCAS, and this is the sole form of ketotifen that is available in the [place] through standard commercial pharmacies. Demonstration of efficacy of ketotifen in one form is often a sign that the other form will be effective, too. In my experience, different compounding pharmacies have quoted vastly different prices for compounding oral ketotifen; patients are advised to obtain multiple quotes. Although I do have some patients who have chosen to import inexpensive commercial oral ketotifen formulations from foreign pharmacies, given the many concerning reports of poor quality of some medication products obtained through some foreign pharmacies, I do not recommend my [place] patients obtain through a foreign pharmacy any pharmaceutical which can be legally (even if more expensively) obtained through a [place] pharmacy. Especially given the legal protections afforded [place] patients who obtain medications through [place] pharmacies -- protections which may be partly compromised or even non-existent in dealing with foreign pharmacies -- foreign-sourcing of pharmaceuticals is a risk calculation to be made entirely by the patient.      As mentioned above, tranilast is readily available in the [place] but only available in the [place] via compounding. Dosing typically is 200 mg tid.      Especially in view of  "how the same serotonin reuptake elements present on neurons are also present on the surface of the mast cells, selective serotonin reuptake inhibitors (SSRIs) can be helpful in some MCAS patients both through neuronal binding and mast cell binding. Dosing of SSRIs in MCAS is similar to dosing of these drugs for their approved indications. Of note, physicians who prescribe SSRIs in MCAS patients must be alert to development of, and know how to manage, serotonin syndrome, whose presentation can easily be confused for a flare of mast cell activation.      Cromolyn is not absorbed to any significant extent (there is controversy as to whether this holds true in the pulmonary tree) and thus does not circulate systemically to any significant extent but still can be helpful in its OTC nasal spray (Nasalcrom) and eyedrop (Opticrom) formulations as well as when inhaled or swallowed. (A cream for topical cutaneous use can be compounded at home, too, using a bottle of Nasalcrom and various skin creams such as Eucerin; recipes can readily be found on-line.) The oral form is typically started as 100 mg (one ampule) twice daily (preferably, but not necessarily, 30 minutes before meals) and escalated every 1-2 weeks in dose or frequency as tolerated to maximal efficacy or a maximum dose of 200 mg qid, though a few patients have told me that 300-400 mg qid has provided them optimal benefit from the drug and that it's unhelpful for them at lower doses. The nebulized form is typically started at 20 mg bid and escalated every 1-2 weeks as tolerated to maximal efficacy or a maximum dose of 20 mg qid. In a minority of patients, initiation of cromolyn causes an initial mild-moderate flaring of disease that usually can be managed with simple extra dosings of \"rescue medications\" (e.g., H1/H2 blockers, possibly also benzodiazepines and/or steroids). Such flares typically settle after 3-7 days, but if flares are severe or appear to be " "coursing chronically rather than settling, then the drug should be stopped unless it is the generic formulation that was initially dispensed, in which case a trial of brand-name Gastrocrom should also be pursued, as I have seen a number of MCAS patients who find the generic formulation intolerable but who then tolerate, and respond well, to Gastrocrom (in spite of both products bearing identical ingredient lists of just cromolyn and sterile water), obviously implicating the presence in the generic product of an excipient of some sort (whether known to the  or not) which is an offending/triggering excipient in at least some MCAS patients. Importantly, cromolyn is not absorbed, so local and distant benefits seen from successful inhibition of mast cell activation at one site by one form of cromolyn (e.g., oral) may be different from local and distant benefits seen from additional application of cromolyn at a different site (e.g., the sinonasal tract, with nasal cromolyn spray).      Pentosan is typically used to help settle mast cell activation in the  tract (e.g., when the sterile inflammatory symptoms of \"interstitial cystitis\" -- frequency, urgency, dysuria, etc. -- are present). Dosing is 100 mg bid-tid and liver function tests must be monitored (typically monthly in the first quarter and quarterly thereafter).      Long available in [place] until finally gaining approval in the [place] in 6/15, ivabradine is officially approved for heart failure but can be helpful in MCAS, principally for patients particularly bothered by tachycardia. The approved initial dosing of 5 mg bid often is excessive for MCAS patients, and starting instead at just 1.25 mg once daily is probably more appropriate, escalating every 1-2 weeks as tolerated (bradycardia often is the limiting symptom) to maximal efficacy or a maximum dose of 7.5 mg bid.      Much as how benzodiazepines can downregulate neurons and mast cells via " "inhibitory cell-surface benzodiazepine receptors, dronabinol can downregulate neurons and mast cells via inhibitory cell-surface cannabinoid receptors. Dronabinol dosing typically begins at 2.5 mg bid and escalates every 1-2 weeks as tolerated in steps of 2.5 mg bid to maximal efficacy or a maximum dose of around 5-7.5 mg bid-tid. The key compound desired in the cannabinoids, of course, is cannabidiol, not the \"high\"-producing THC. I have heard from occasional MCAS patients who report cannabidiol oil (now legally accessible in certain localities) to be helpful. It is difficult to recommend smoking of cannabis due to the many other toxic compounds in smoke, which in general is a mast cell activator.      Hydroxyurea has been recognized for decades as capable of settling mast cell activation in some patients. The dosing needed to achieve such effect is typically low, starting at 500 mg once daily and escalating after 2-4 weeks as tolerated to maximal efficacy or 1000 mg once daily (though some patients report better effect at 500 mg bid). Patients who react acutely and severely to the \"Hydrea\" 500 mg formulation likely are reacting to excipients, and I have found most such patients (once recovered after having stopped the medicine) subsequently tolerate and respond well to an alternative 200 mg \"Droxia\" formulation, with optimal dosing usually working out to be in the 600-1000 mg total daily dose range. In my experience, hydroxyurea has been particularly useful for treating the diffusely migratory soft-tissue/bone aching/pain commonly seen in MCAS.      Perhaps due directly to the IgG receptors on the surface of the mast cell, and/or perhaps due to other, less direct mechanisms, IV immune globulin (IVIG) helps some MCAS patients (who often first come to IVIG therapy via diagnosis (prior to diagnosis of MCAS, of course) with \"combined variable immunodeficiency\" or other, typically poorly defined immunodeficiency " syndromes), principally (per my unpublished observations) in reducing fatigue for 1-2 weeks, in accordance with the half-life of human antibodies. Due to the drug's modest and brief benefits and great expense, I am not much of a fan of using IVIG for treating MCAS, and I suspect most patients being treated as such likely can find more medically and financially effective therapy. Still, when most or all other options have been exhausted, it is not an entirely unreasonable option from a biological plausability perspective, though I should note I'm unaware of any published literature (even case reports) actually supporting this particular use of IVIG.  However, I will also note that dysautonomias of various sorts (e.g., postural orthostatic tachycardia syndrome, pseudoseizures, etc.) are common in MCAS, and IVIG has been well established in the literature since at least the '90s as a treatment for dysautonomias, so establishment of a co-morbid diagnosis of a dysautonomia may help achieve insurance coverage for a trial of IVIG in an MCAS patient.      As noted in many case reports now in the peer-reviewed literature, the anti-IgE monoclonal antibody omalizumab appears to benefit some patients with MCAS, utterly independent of the pre-treatment IgE level. Dosing typically begins at 150 mg subcutaneously once every four weeks and escalates in steps as high as 300 mg subcutaneously every two weeks. In counseling patients about the risks of the drug, they should be advised of the 0.1% risk of fatal anaphylaxis mentioned in the product insert, and it is important to follow the 's recommendations to observe the patient in the infusion suite for 2-3 hours after each of the first three injections and then at least 30 minutes after each subsequent injection. In my experience, omalizumab tends to be more helpful for MCAS patients with prolific allergies, but its great cost (list price approximately US$30,000/year)  "needs to be considered when deciding whether to try it ahead of less expensive therapies. Before starting omalizumab, it may be worthwhile checking an anti-IgE IgG level, as an elevated level may indicate the presence of a true autoimmune-mediated mast cell activation for which rituximab (see below) might also be worth trying. Again, if this is going to be checked, it needs to be checked before starting omalizumab since the drug will confound the test and the patient will have to be off the drug at least a year before a reliable native anti-IgE IgG level can be determined again.      Similar cost-benefit calculation is required regarding the tyrosine kinase inhibitors. The prototypical tyrosine kinase inhibitor imatinib (~[place]$120,000/year in the [place] for name-brand \"Gleevec\" from [employer], or ~US$60,000/year for the generic imatinib from [employer] which became newly available as of 2/16) is believed to target and block/inhibit some of the constitutively activating mutations in KIT suggested by some of the published research to be present in virtually every MCAS patient. The [id number] mutation found so frequently in mastocytosis is resistant to imatinib, but this mutation is virtually never found in MCAS. Imatinib-sensitive mast cell disease tends to be sensitive to low doses of the drug. Imatinib is typically managed by a hematologist/oncologist. A starting dose of 100 mg once daily is appropriate, escalating after one week (if tolerating it OK but unimproved) to 200 mg which typically is given once daily, though I have seen occasional patients report substantially better effect at 100 mg bid or 50 mg tid-qid dosing. In my experience now in seeing this drug control MCAS to a significant degree in several dozen patients, the \"sweet spot\" for dosing tends to be a 200 mg total daily dose (most do fine with once daily dosing), but I do have a few patients who have clearly identified that higher doses (300-600 " "mg total daily dose) definitely serve them better. In my experience, imatinib has tended to provide substantive benefit in MCAS patients manifesting relative or absolute erythrocytosis, perhaps as a consequence of constitutive KIT activation in turn driving JAK2 activation. (Of course, activated KIT will drive activation of the other, inflammation-driving JAKs, too, suggesting a potential role for the typically promiscuous SELENA inhibitors in controlling at least some of the symptoms in mast cell disease. Indeed, I have already seen (but not yet published) excellent responses in mast-cell-driven symptoms from ruxolitinib in myelofibrosis and polycythemia vera patients and from tofacitinib in rheumatoid arthritis patients.) Absolute erythrocytosis, of course, is easy to recognize (hemoglobin, hematocrit, and/or red cell count above the upper limit of normal), but a relative erythrocytosis is more difficult to recognize, manifesting as a \"normal\" H/H in patients with marked multisystem inflammation which one would expect to cause a secondary anemia of chronic inflammation. A chronically inflamed MCAS patient's \"normal\" H/H may be evidence of the abnormal \"pressure\" being exerted on marrow to overproduce red cells, and this finding, set against a history and exam strongly suggestive of significant chronic multisystem inflammation, hints that a trial of imatinib might be appropriate sooner than its great cost might otherwise warrant. In patients with trying economic circumstances, sometimes the 's patient assistance program needs to be engaged in order for the patient to be able to access imatinib. Additional manufacturers ([employer]) also brought generic imatinib formulations to the [place] market beginning in August 2016 when [employer] lost its exclusive license for marketing generic imatinib, and the price is expected to fall dramatically further in the relatively near future.  Patients " "experiencing difficulty tolerating [employer]- and [employer]-branded imatinib may find these other formulations of imatinib, using different excipients, more tolerable. It remains to be seen whether imatinib will be made available by any  for compounding.      As inferred above, this patient's chronic multisystem inflammation would be expected to cause at least a mild anemia of chronic inflammation, so the \"normal\" H/H in this patient may represent a modest relative polycythemia, thus potentially arguing for a trial of imatinib somewhat earlier in the sequence of therapeutic efforts than its extraordinary cost ordinarily would warrant. (One wonders whether the polycythemia in MCAS comes dominantly from constitutive activation of JAK2 caused by (mutated), constitutively activated KIT (various mutant forms of which imatinib and the other tyrosine kinase inhibitors can address).)      I also have seen low-dose imatinib (again, most commonly 200 mg/d) quickly bring complete resolution to the lipodystrophy (along with great improvement in many other symptoms) in a number of patients who were diagnosed with Dercum's disease before later being discovered to have MCAS as their unifying diagnosis. I also have seen low-dose imatinib quickly bring complete resolution to very severe, otherwise refractory hypertriglyceridemia, similarly suggesting there is some pathway by which MCAS can generate such a dyslipidemia.      Some patients who do not respond to imatinib go on to respond well to other TKIs such as dasatinib or nilotinib. As with imatinib, usually only low doses (e.g., 20-40 mg of dasatinib daily) are needed in such patients. Although there are a very few cases reported in the literature of nilotinib helping to control mast cell disease, I myself have seen no successes with this drug in the few patients in whom I have tried it; in fact, I have seen acute intolerance in about half the patients in whom I have tried " it, again suggesting an issue with reactivity to the medication product's excipients rather than nilotinib itself. I also am aware of a few cases (one recently published in the  Journal of Haematology) in which low-dose (12.5 mg/d) sunitinib was given for very severe MCAS and rapidly achieved complete response (1 case) or very good partial response (a few additional cases) without any apparent toxicity (now sustained about two years in the published patient with a complete response). (There also is a published pre-clinical report in a rat model providing rationale for trying sunitinib in mast cell disease.)      In general, I have observed low-dose dasatinib to be more effective for CNS-related symptoms (e.g., profound, ultra-refractory depression in one patient) and low-dose sunitinib to be more effective for patients whose MCAS phenotype features a strong allergic/anaphylactoid component. Although dasatinib has a propensity at CML-level dosing (~100 mg/d) to drive effusions, I have never seen such problems develop at the 20-40 mg/d dosing level. Still, in a patient with significant pre-existing pulmonary disease, the potential for pulmonary complications would have to be watched especially carefully if dasatinib were to be started.      As activated KIT drives activation of the JAKs, and since JAK1 and JAK3 drive inflammatory  production, inhibition of JAK1/JAK3 may be a route, too, toward at least some symptomatic improvement in MCAS, and there now is a case report (Eur J Haematol 2017) of successful use of tofacitinib (in fact, even more success with extended-release product 11 mg qd than immediate-release product 5 mg bid) in two patients, including an intriguing excellent response in one of these patients of her marked post-prandial abdominal bloating, a symptom which may represent an acute bowel musculature dysautonomia (analogous to the vascular dysautonomia of postural orthostatic  tachycardia syndrome or the neurologic dysautonomia of pseudoseizures not uncommonly seen in MCAS) and which often unfortunately proves particularly refractory to other treatments.     I have occasionally seen somatostatin prove helpful for refractory non-infectious diarrhea in MCAS.  Usually the long-acting form of the drug (Sandostatin LAR) is used, with dosing typically in the 10-30 mg range (taken subcutaneously every four weeks).      I have heard from an occasional colleague (though not seen reported yet) that ursodiol (standard dosing 300 mg bid) can be helpful for GI symptoms in an occasional MCAS patient, though to date I myself have seen such improvement in only a single patient.      As inferred by its very name, MCAS is a disease principally of inappropriate mast cell activation, not inappropriate mast cell proliferation. As such, it would seem there is little call in the management of MCAS for the antiproliferative treatments used to treat mastocytosis such as cladribine and interferon. Both drugs have substantial toxicities which also would question their utility in MCAS, principally cytopenias and immunosuppression with cladribine and a flu-like syndrome with interferon which in my experience often don't tachyphylax as significantly or briskly as the manufacturers typically promote. Alpha interferon also bears risks for significant cytopenias, hypothyroidism, and depression. Nevertheless, downregulation of mast cell activation has been seen with alpha interferon.  Thus, one wonders whether a cautious trial of the drug might be worthwhile at some point in otherwise refractory MCAS patients, particularly those whose medication excipient intolerance issues preclude trials of most oral medications. I am unaware of any peer-reviewed literature reporting the use of cytotoxic chemotherapy such as cladribine, or any type of interferon, in the treatment of MCAS. There are preclinical data suggesting  "CD30-targeting brentuximab may be helpful for cytoreduction and  release downregulation in mastocytosis, but there has been no clinical testing yet in that disease, let alone in MCAS.      Urgent/emergent management of flares of mast cell disease generally include extra dosings (IV if possible) of H1 blockers (e.g., diphenhydramine  mg) and H2 blockers (e.g., famotidine 20-60 mg), possibly also with glucocorticoids (e.g., methylprednisolone 1-2 mg/kg) and possibly also with benzodiazepines (e.g., lorazepam 1-3 mg). Patients are advised to try different formulations of rapid-acting antihistamine products (e.g., liquid formulations, or rapid-dissolving formulations such as Claritin Reditab or Zyrtec Fast-Melt) to identify what is tolerable and works well for them and then to regularly carry such products with them if they frequently suffer flares. Of course, epinephrine is always the \"go-to\" drug for anaphylaxis unless the patient is on a beta-blocker, in which glucagon is the \"work-around\" drug. If a mast cell patient has suffered repeated helga anaphylaxis or severely anaphylactoid states, particularly if the triggers are unclear, it is recommended that the patient always have immediate access to two doses of epinephrine (or glucagon, as just noted), usually via an Epi-Pen or equivalent device. Although The Mastocytosis Society and some experts recommend that *all* patients with (any form of) mast cell disease always carry epinephrine autoinjectors, it is unclear to me -- especially as we are increasingly learning how large and diverse the MCAS population truly is -- whether such a recommendation should indeed be made so globally. Though anaphylaxis obviously can be quickly fatal, and though anaphylaxis is always a risk at some level in (diagnosed and undiagnosed) mast cell disease patients, past behavior of MCAS largely predicts future behavior (at least until a new major stressor occurs), so in my " opinion it is acceptable for patients who have never anaphylaxed (or perhaps anaphylaxed only a long time ago to a specific agent which they have since avoided and are likely to continue to be successful in avoiding) to weigh their small (but non-zero) risk for anaphylaxis against the costs, in all the meanings of that word, of carrying epinephrine autoinjectors on their persons for the rest of their lives. Some patients feel Epi-Pen Twinjectors or other autoinjector devices are more convenient than standard traditional Epi-Pens; efforts should be made to accommodate the preferences of the patient who will need to carry the device(s) with him/her for the rest of his/her life. The indications for usage of an epinephrine autoinjector is when the patient can't breathe or can't swallow, and it's important that patients be instructed in the proper use of such a device (in general: call for help; lie down flat; inject in one lateral thigh (through clothing is OK) (unless the instructions with her specific device direct an alternative injection approach), then inject again in the contralateral thigh if unimproved after five minutes and help hasn't yet arrived). For obvious reasons, it's very important for patients to read the instructions for their epinephrine autoinjectors as soon as they receive them, and preferably for them to even have a chance to simulate use with a dummy device.      Another drug primarily used for emergent management of MCAS -- particularly in patients with refractory angioedema -- is icatibant (30 mg, injected subcutaneously in the abdomen). The drug can be extremely effective very quickly, but its extreme expense (approximately US$7,000 per dose) perhaps calls for at least a brief trial of routine emergent management (e.g., antihistamines, steroids, benzodiazepines) before resorting to this option.  One must also be cognizant of the fact that icatibant is in the class of peptidergic drugs, and  "data have begun emerging in the literature suggesting that this class of agents may pose as triggers in some MCAD/MCAS patients.      I'll also note that there have now been a very few case reports in the literature of rituximab working very well to control (at least for several months) otherwise refractory \"idiopathic anaphylaxis\" (IA), which in my opinion almost certainly is a manifestation of MCAS. There also are small studies showing efficacy of immunosuppressive approaches such as with rituximab or cyclophosphamide or cyclosporine in treatment of \"chronic (auto)immune urticaria\" (associated with anti-IgE and/or anti-IgE-receptor autoantibodies).  It is likely that IA and CIU are manifestations of MCAD/MCAS, and thus, whether one applies one of these diagnostic labels or another to such patients, I feel rituximab (or other immunosuppressive approaches as briefly suggested above) are reasonable, but patients and their treating physicians need to be cognizant that rituximab is not curative and is expensive and that it takes roughly one year to reconstitute B-cell immunity after rituximab treatment. I'll also note that it's the treatment successes, not failures, that tend to get reported in the case literature, and I have heard from some physicians that their trials of rituximab for a few of their IA/MCAS patients have shown no benefit at all.  I, too, have seen more failures of immunosuppressant treatment of MCAD/MCAS than successes, but the occasional success of course is gratifying when many other treatments have failed.      Although her anti-IgE and anti-IgE-receptor antibody levels are normal, I'll make general note here that autoimmunity is frequently \"spun off\" from/by mast cell disease, and in my experience most such autoantibodies are pseudoantibodies or \"mimicking\" antibodies, i.e., specific enough to react with the corresponding probe but insufficiently specific to actually cause the disease with which " "the antibody is associated.)  There simply is no testing available that can distinguish clinically active mast-cell-targeting autoantibodies from non-pathogenic pseudo-mast-cell-targeting autoantibodies.  I would be cautious about diagnosing (let alone treating) any antibody-associated disease (autoimmune or infectious) based solely on the detected presence of an antibody unless the classical clinical presentation of the disease is also present.    Perioperative management is similar to emergent management. The surgeon and anesthesiologist should be aware of the patient's mast cell disease and should read any of the many treatises in the literature on the subject (a link to one such article is provided below). Perioperative guidance for professionals is also available on the website of The Mastocytosis Society at http://[email or web address].org.      A medical alert bracelet is often helpful to emergency personnel. As most such personnel are not presently trained regarding mast cell disease, the first word on the bracelet should be \"anaphylaxis.\"      Although it's likely that most or all of an MCAS patient's various problems are due directly or indirectly to the MCAS, I'll also note the imperative that MCAS patients not assume that major exacerbations of prior problems, or emergence of new problems, are necessarily due to their mast cell disease. Serious illness should always first undergo standard evaluation as appropriate for the particulars of that illness, and only if no other etiology is identified, and if the problem is one that can reasonably be attributed to mast cell disease, is it then appropriate to attribute the problem, by default, to the mast cell disease. Even if an identified problem is indeed attributable (directly or indirectly) to mast cell disease, any standard/classic therapy that exists for the problem should be applied (concurrently with MCAS-directed therapy). For example, mast cell disease can " "cause myocardial infarction via multiple mechanisms, but standard myocardial infarction therapy is certainly the priority over mast-cell-directed therapy for any myocardial infarction being driven by mast cell disease. The tendency of an MCAS patient to blame all ills directly on the mast cell disease, and the desire in such patients to focus solely on MCAS-directed therapy, may be understandable, and MCAS patients typically have so often been disserved in urgent and emergency care facilities that their desire to avoid them may also be understandable, but staying home and trying to initially treat acute severe chest pain with Benadryl is obviously a Very Bad Idea that could easily and quickly prove fatal.      It's so important that I will say it again: there is no method at present to provide better guidance as to which medications should be tried sooner vs. later, there is no \"right\" or \"wrong\" decision as to which intervention to try next, and there simply is no substitute for patience, persistence, and a methodical approach -- on the parts of both the patient and the physician -- in the journey toward identifying optimal therapy for the individual patient. Some patients are so haider as to identify substantially helpful therapy within the first few months of beginning the journey; other patients require years, and trials of more than a dozen medications, before a truly beneficial one is found. Enrollment in clinical trials should be considered when such trials become available.      Mast cell disease often drives bone changes (far more often osteopenia or osteoporosis than osteosclerosis, but sometimes both osteopenia/osteoporosis and osteosclerosis at different sites in the same patient at the same time). It is reasonable to check baseline bone densitometry upon diagnosis of a mast cell disorder if not already recently done. If the patient is found to be osteopenic or osteoporotic, routine vitamin D and calcium " "treatment should be tried first, but if follow-up bone densitometry proves such basic treatment to be unhelpful, then bisphosphonate or anti-RANKL therapy is warranted. (I'll note I've often seen MCAS patients prove intolerant of bisphosphonates, but I've never seen bisphosphonate-intolerant MCAS patients prove intolerant of (or unresponsive to) denosumab.) Of course, the single best approach to managing bone disease consequent to MCAS is to control the MCAS as best as possible. I should also note that use of bisphosphonates or denosumab requires pre-treatment clearance by a dentist due to the possibility of these drugs causing (potentially quite morbid, even fatal) osteonecrosis of the jaw (ONJ) in patients with poor dentition or recent dental work.      A priori, the odds of eventually achieving the typical (palliative) MCAS management goal (of finding a regimen that will help the patient feel significantly better than the pre-treatment baseline the majority of the time) in any given MCAS patient are as good as in any other patient (that is to say, pretty good), but only time will tell the ultimate outcome for the individual patient.      In case the following might be helpful, here are a few references to peer-reviewed literature and other educational material about MCAS (authorial bias acknowledged):      1. A short review that provides the \"Molderings 2011\" diagnostic criteria: http://www.jhoonline.org/content/4/1/10. Also, the Valent 2012 criteria are available at http://epub..[facility].de/[email or web address].pdf, but I'll note I strongly favor the Molderings 2011 criteria over the Valent 2012 criteria since in my experience the latter fit the observed and reported biology of the disease less well than the former.      2. An updated short review from 2014: http://www.allergysa.org/Content/Journals/September2014/ThePresentation.pdf.      3. An approach to diagnosis from 2014: " http://www.Lionseek/[email or web address]/pdf/v3/i1/1.pdf.      4. A comprehensive review chapter from 2013: https://www.FSAstore.com.[employer]/catalog/product_info.php?products_id=13397.      5. A transcribed grand rounds (including slides) from 2011 ([facility]): http://mastocytosis.ca/jlzlosxzkv0689.html.      6. A video of a grand rounds from 2014 ([facility]): see the August 6, 2014 entry under Allergy Webinars at http://www.paediatrics.t.ac.za/scah/clinicalservices/medical/allergy.      7. There are many reviews in the peer-reviewed medical literature about perioperative management of mast cell disease (all focused on mastocytosis, but the principles are the same for MCAS). At present, the most recent is Dewachter et al., Anesthesiology 2014, DOI 10.1097/ALN.[id number] (this may be freely available to some at http://anesthesiology.pubs.asahq.org/article.aspx?nleblyodl=2538923). A shorter discussion of perioperative management is available from The Mastocytosis Society at http://www.tmsforacure.org/documents/ChroniclesSE.pdf.      8. I should note, too, that given the fundamental precept that mast cells are present, and contribute to regulation of function, in *all* systems/organs/tissues, it is most certainly the case that MCAS can cause a wide range of neuropsychiatric symptoms and disorders, too. Prior to diagnosis, most MCAS patients are thought by at least some of their providers, family, and acquaintances to be psychosomatic. Along with a number of co-authors, I recently published a review of the neuropsychiatric aspects of MCAD. It's not freely available, but the access point is http://www.sciencedirect.com/science/article/pii/L6327232577120273. I'm happy to provide a copy of this for private use upon request.      9. My early experience with continuous diphenhydramine infusion is available as an American Society of Hematology 2015 abstract at  "http://www.bloodjournal.org/content/126/23/5194.      10. A comprehensive review of pharmacologic therapy of systemic mast cell activation disease has been published recently (Shantel JIN et al., Pharmacological treatment options for mast cell activation disease, [facility] Arch Pharmacol 2016 Apr 30, DOI: 10.1007/e43900-985-4876-9).      11. A more comprehensive characterization of MCAS has been published recently (Michaela LB et al., Characterization of mast cell activation syndrome, Am J Med Sci 2017 Mar;353(3):207-215, DOI: 10.1016/j.amjms.2016.12.013).    In summary, I think there are many therapeutic directions that could be legitimately pursued in this patient, and there simply are no data at present to say, or even suggest, that any one particular sequencing of medication trials is \"more right\" or \"more wrong\" than any other sequencing. Determination of a particular sequence to be pursued in this patient will depend on the treating provider's best clinical sense of the matter as influenced by present and emerging symptoms and findings, patient desires, and treatment accessibility (i.e., vis-a-vis third-party payer coverage). Again, there is no \"right\" or \"wrong\" decision here regarding which medication to try next. I can only emphasize the importance of having the patience to make just one change in the regimen at a time whenever possible, and in truth the only \"wrong\" decision that can be made here is to stop trying altogether (unless, of course, the patient decides, for whatever reason, she just does not want to pursue any more medication trials).      All of the above having been said, I think that given the particulars of her presentation, it might be best to focus aggressively at first on identifying an optimal full (H1 + H2) histamine receptor blocker regimen for her.  Given her inflammatory issues and the elevated prostaglandin moieties, trials of NSAIDs (e.g., aspirin first, then celecoxib " "if intolerant of or unresponsive to aspirin) would be reasonable. For GI tract issues, medications beyond antihistamines that might be helpful include (but certainly are not limited to) oral cromolyn, montelukast, compounded oral ketotifen, and low-dose benzodiazepines.  If oral cromolyn is helpful, other forms of cromolyn (e.g., nasal, ophthalmic, nebulized) might also help.  Pentosan might be useful to try if her symptoms consistent with interstitial cystitis are sufficiently bothersome.  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  Clearly, there are many options, and it will be important for her to try just one at a time as much as possible, and it also will be best for her to work principally with just one of her physicians in stepping through trials of various medications for this disease, though other local consultants certainly can be engaged as necessary for trials of select medications with which her lead physician may be unfamiliar or otherwise uncomfortable in personally prescribing (e.g., imatinib).      The previously established follow-up plan remains sufficient. The patient understands that out of professional courtesy I will not \"cold-call\" or \"cold-email\" any health care provider about her. However, I will be happy to respond to any provider's *direct* request to me for additional input in her case.  Until my impending departure from [facility] in late July (note I will provide my patients my future contact information before that point), I can best be contacted either via e-mail at [email or web address], or a phone call can be scheduled via office staff here at [phone], or I can be paged via our " "[facility]'s paging  at [phone] if emergency consultation is felt necessary. Also, my clinic nurse coordinator, Kayleen Carrington RN, can be contacted at [phone]; patients wishing to contact her electronically should preferentially use the \"[facility] [employer]\" portion of the electronic medical record system here. Of note, at present I do not provide phone-based (or video-based) follow-up visits to patients, and the phone numbers I've provided here are intended for use by the patient's physicians and other providers. A patient who calls me to report new problems (or severe exacerbations of old problems) and request guidance (or request provision of guidance to her local provider) should expect me to redirect her to her local providers to first be properly assessed in standard fashion for the particular problems and complaints being experienced at that time, and if, after such evaluation has been performed, the provider desires to discuss the situation further with me, I will be happy to engage in that dialogue if, again, the provider directly requests my input. As stated above, mast cell disease does not render one immune to other diseases, and the patient and all of her providers must remain vigilant to the possible emergence of other illnesses (whether ultimately attributable to her mast cell disease or not) for which standard (non-mast-cell-directed) treatments have been defined.      I spent another two hours writing this addendum, but as this was not face-to-face time with the patient, no additional billing was submitted.      Typed, reviewed, and electronically signed by: Spencer Jennings M.D.     DT: 06/26/2017 12:11 AM    cc: 1. Lucia Christianson M.D. ([address] [address], [phone], fax [phone])  2. Please also mail a copy to the patient at her home address as listed in the system.  "

## 2017-01-25 NOTE — LETTER
"  2017      RE: Laverne Irvin  [address] [facility] [address]       Patient: Laverne Irvin (MRN [id number])   Encounter Date: 2017  Referring: Lucia Christianson M.D. ([address] [address], [phone], fax [phone])  Attending: Spencer Jennings M.D.     Chief Complaint/Reason for Referral: Second opinion regarding possible mast cell activation disease (MCAD).    History of Present Illness: This 56 year old  white A1 medically disabled (on SSDI since 7/15)  is kindly referred in consultation regarding the above-noted issue.  At the beginning of the encounter, I reviewed all available chart data, consisting principally of about 30 notes and a handful of test results in her electronic chart here dating back to , the salient points of which I've incorporated into the narrative below.  The history here is a bit complex, and it's probably best to just present it all chronologically, blending HPI and PMH as follows.  I should note here at the outset, too, that the patient obviously was afflicted by a good bit of cognitive dysfunction and was having a fair bit of trouble with her remote and recent memory.  She kept apologizing for having such difficulty recalling and recounting her history.  She provided a rather \"scattershot\" history, and I've done my best here to reconstruct it into chronological order, but it's possible I might be missing some significant events or have some events out of order.  Her history of chronic multisystem polymorbidity of general themes of inflammation, allergy, and aberrant growth and development in various tissues actually date back to her earliest memories, recalling that she has never been able to sweat and thus can't bask in the sun lest she quickly get \"overheated.\"  She also recalls she has \"always\" (again, dating to her earlier " "memories) suffered an array of allergies, requiring special soaps and being intolerant of make-up and jewelry and a wide assortment of fragrances and chemical odors.  She doesn't recall any other health issues from childhood but knows that migraines started at 13, and when menarche came at 14, she was immediately beset by \"horrible\" dysmenorrhea and menorrhagia, not uncommonly even causing incidents of helga syncope, all of which continued until her first pregnancy.  She also recalls other problems beginning in early to mid adolescence including an unusual extent of exercise-induced fatigue, many episodes of bronchitis (since about 15) and sinusitis (since about 16).  She says she was \"constantly\" tachycardic since some point in adolescence, and an assortment of medications tried for this never helped much.  She also recalls she has been constipated \"forever,\" though in more recent years this has alternated with diarrhea.  She also recalls suffering gastroesophageal reflux disease for many years and simply cannot remember how far back this began.  She knows she suffered an episode of \"toxic shock syndrome\" in high school.  Her first pregnancy came at 27, notable only for prolonged labor and her  daughter having a heart condition of some sort.  Her second pregnancy came at 32 and went smoothly.  Her third pregnancy came at 33 and was miscarried early; no cause was sought or identified.  Her fourth (and final) pregnancy came at 36 and resulted in placenta previa requiring bedrest for many weeks with delivery by emergency  at 30 weeks, and then the  wound dehisced and had to heal by secondary intention.  Soon after this last pregnancy she began developing neck pain, and at 39 a Chiari malformation was diagnosed as the cause of this.  She underwent decompression surgery which did help initially -- she says it was a \"lifesaver,\" as previously she had been \"crippled\" by the pain -- but over time " "symptoms have slowly relapsed.  Bladder surgery of some sort (possibly a sling?) came around age 41, followed by total abdominal hysterectomy and bilateral salpingo-oophorectomy at 47 and resection of a large thigh lipoma a month later.  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  She was seen for this by Dr. Lucia Christianson, who did diagnose EDS3; genetic testing for other forms of EDS and other connective tissue disorders was all negative.  Dr. Christianson also came to suspect the patient might have a mast cell activation syndrome, leading to the present evaluation.  Unfortunately, she did not receive the letter we usually send to patients ahead of their appointments advising cessation of NSAIDs/salicylates and PPIs if possible, and the patient actually has continued taking her regular NSAID and PPI medications right through to this morning.  She says her chief complaint is diffusely migratory pain.    On a full review of systems, although she denies any issues with flushing, epistaxis, easy bleeding, irritation of the nose or mouth or throat, or adenopathy, she endorses a wide range of other, chronic/recurrent, episodic/intermittent and/or waxing/waning issues including subjective (rarely objective) fevers, feeling cold much of the time, constant marked fatigue (often to the point of exhaustion), malaise, headaches, diffusely migratory aching/pain, diffusely migratory pruritus, lifelong anhidrosis, unprovoked fluctuations in weight and appetite, irritation of the eyes, frequent acute brief inability to focus vision, slight hearing loss, tinnitus, easy bruising, \"constant\" sinonasal congestion, occasional coryza, occasional post-nasal drip, occasional proximal dysphagia, dyspnea, occasional palpitations, non-anginal central chest discomfort/pain, gastroesophageal reflux, " "occasional nausea, rare vomiting, diarrhea alternating with constipation, diffusely migratory abdominal discomfort including (usually post-prandial) bloating, urinary frequency and retention, diffusely migratory (but principally just left-sided) weakness, edema largely limited to the left face, diffusely migratory tingling/numbness (typically about the distal extremities), orthostatic and non-orthostatic presyncope, syncope in past as noted above, cognitive dysfunction (particularly memory, concentration, and word-finding), hair loss, deterioration of dentition despite good attention to dental hygiene, brittle nails, diffusely migratory rashes (typically patchy macular erythema), hives (principally just in reaction to certain medications), insomnia, early waking, anxiety, depression, diffuse joint hypermobility, and poor healing in general.  Complete ROS o/w neg.    Family history is notable for a father with chronic obstructive pulmonary disease (he had smoked a long time), atrial fibrillation, congestive heart failure, \"non-stop\" itching (especially about his back, where there also are \"lots of moles\"), easy bruising, \"lots of hernias\" including a hiatal hernia, S/P CABG, bladder cancer, and gastroesophageal reflux disease, a mother with chronic bronchitis and sinusitis, episodes of pneumonia, S/P myocardial infarction, episodes in which her \"throat closes,\" vocal cord polyps (she, too, is a smoker), three strokes, diffuse joint hypermobility, anxiety, tremor, and poor healing, two daughters with Chiari malformation which has been surgically decompressed, a ventricular septal defect in her oldest daughter which had spontaneously closed by age 2, EDS3 and dysmenorrhea and menorraghia in all three of her daughters, spells of anxiety, tremor, dyspnea, and palpitations in her youngest child who also suffers migraines, \"eye convergence issues,\" \"lots of dental problems,\" and who has had some concussions, skeletal " "injuries and \"never-ending ankle issues\" in her middle daughter, and a brother with HIV. The patient has smoked up to 2 packs of cigarettes a day from 18 to the present, but she denies any history of significant alcohol use (in fact, just a slight bit causes nausea and presyncope) or illegal substance use except for some experimentation in college plus frequent marijuana use which, most interestingly, \"helepd me get through high school.\"  She notes she has been referred for a trial of medical marijuana.    She lists her current medications as Flonase prn, Senokot-S prn, fluoxetine 60 mg qd, ibuprofen 800 mg bid, omeprazole 20 mg qd, tizanidine 2 mg qhs, acyclovir 400 mg prn, cetirizine 10 mg prn allergies, vitamin D 1000 units qd, Sudafed 60 mg bid, and Percocet prn.  She notes Dr. Christianson previously had empirically tried her on Zyrtec and Zantac bid, but this did not appear to help any of her symptoms, and she stopped taking them regularly quite some time ago.  She lists her current allergies as severe depression to Chantix, hives to penicillins, itching to codeine, fatigue to Cymbalta, gabapentin, and Lyrica, itching to morphine, nightmares to topiramate, emotional lability to tramadol, and rash to erythromycin.    Exam finds a mildly overweight woman appearing her stated age and in no acute distress, obvious smoker's voice but no odor of smoke about her at the moment, pleasant, cooperative, fully alert and oriented, independently (if just a bit slowly/achily) ambulatory, presenting by herself.  Vitals per chart, notable solely for weight 178 pounds.  Key findings are her comfortable general appearance, HEENT benign but for fair dentition (and, as I often see in EDS3, she can open her mouth to an unusually wide degree), no plethora/pallor/diaphoresis/jaundice/rash/bleeding/bruising, neck supple, no JVD or thyromegaly or carotid bruits, no palpable adenopathy or tenderness at any of the usual node-bearing sites, " clear lungs, regular heart (though with curiously soft heart sounds) with no adventitious sounds, abdomen a bit overweight but otherwise benign, a bit of tenderness on palpation over the mid-thoracic spine, no peripheral edema, neuro grossly intact but for her report of chronic tingling/numbness in the distal left extremities.  On a light scratch test on the upper back, a remarkably minimal degree (compared to what I usually see in MCAD/MCAS) of dermatographism (erythroderma only, no hives) emerged but was fully sustained when last checked 10 minutes later.      Review of available lab data was notable for routine blood counts and chemistries (except for mild hypoproteinemia and hypoalbuminemia) and urinalysis in '15.  Interestingly, the pathology on the hysterectomy noted inflammation at the cervix.  The patient says she had a colonoscopy about five years ago at some facility in the [facility] and knows that some sort of mild abnormality was found, but she doesn't know whether any biopsies were obtained.    A/P: Kudos to Dr. Christianson, because I think she's likely right in her suspicion that a mast cell activation disorder (MCAD) -- and far more likely the far more prevalent (if only recently recognized) type termed mast cell activation syndrome (MCAS) than the rare (but long recognized) type termed mastocytosis -- is at the root of most or all of the patient's chronic multisystem polymorbidity of general themes of inflammation, allergy, and aberrant growth and development in various tissues (though obviously the COPD she has to have to some degree at this point is mostly or all due to her smoking, and it's imperative she stop smoking if for no other reason than that it's a potent mast cell activator). Beyond all the other diseases already ruled out by the great extent of testing she's undergone to date, I don't know of any other human disease that comes anywhere near as close as MCAS does to accounting, directly or  indirectly, for the full range and chronicity of all the symptoms and findings here. (Of note, too, I'm not saying that any of her prior diagnoses are wrong. It's just that each of them accounts for only one subset or another of all that's gone on in her, while MCAS can account for most or all of everything that's been going on in her.) All of that said, she needs objective laboratory evidence of improperly behaving mast cells, toward which end I ordered the range of testing I typically check in assessing for MCAS, namely, a CBCD, CMP, magnesium, chilled serum tryptase and chromogranin A, chilled plasma prostaglandin D2 and histamine and heparin, chilled random urinary prostaglandin D2 and N-methylhistamine and leukotriene E4 and 2,3-dinor 11-beta-prostaglandin-F2-alpha, and chilled 24-hour urinary prostaglandin D2 and N-methylhistamine and leukotriene E4 and 2,3-dinor 11-beta-prostaglandin-F2-alpha. I also ordered an anti-IgE IgG and an anti-IgE receptor antibody, which won't be useful diagnostically but may influence certain therapeutic decisions down the road if MCAS is proven. However, since she has been using her ibuprofen and omeprazole up to this morning, I asked her to stop these drugs (and any other NSAIDs, salicylates, and PPIs) if possible and return in a week to provide blood and urine samples.  We provided her careful written and verbal instructions regarding the 24-hour urine collection including the importance of continuous chilling of the specimen throughout collection and transport.  She understands the various reasons why a single round of  testing might yield all negative results, and she understands that if this happens, it of course doesn't even begin to invalidate/refute/negate even a single element of her history (which strongly argues for a mast cell disorder). Her history is what it is, so if we get a negative round of testing, I'll simply recommend repeat testing (which she'll of  "course need to pursue locally), albeit with specimen collection at that point preferably deferred to a particularly symptomatic day. I also asked her to work with our clinic nurse coordinator, Kayleen Carrington RN, to try to arrange for her old colonic biopsies (if any) to be reassessed (either locally or here) with  staining (and we'll do the same here for the resected cervical tissue which showed inflammation), as mast cells can't be seen with routine H&E staining and I've often seen \"normal\" or \"mildly chronically inflamed\" GI tract biopsies in MCAS patients \"light up\" on  staining revealing mast cell disease. If 2-3 rounds of blood and urine testing yields all negative results, and if restaining of any available old biopsies either can't be done for some reason or yields negative results, the \"backup plan\" will be to pursue a fresh set of bidirectional endoscopies to obtain biopsies throughout the luminal GI tract for pathologic evaluation specifically (using  staining at a minimum) for increased (>20/hpf) numbers of mast cells (as often seen mildly-moderately in MCAS, though not to anywhere near the degree (or patterns) seen in mastocytosis).    Although I cautioned her that she doesn't have a definitive diagnosis of MCAS yet, we did engage in extended discussion today about general aspects of MCAS including its essential nature of chronic multisystem polymorbidity of a generally inflammatory theme, the availability of many therapies shown helpful in various MCAD/MCAS patients, the good likelihood of (eventually) finding therapy that will help her achieve the goal of feeling significantly better than the pre-treatment baseline the majority of the time, and the present frustrating absence of any biomarkers that can help predict which of the many available, potentially helpful therapies is most likely to benefit the individual patient. She understands that if we are able to secure a diagnosis " "of MCAS, she will be dependent on pursuing -- in patient, persistent, and methodical fashion, trying as hard as possible to make just one change at a time -- trials of the various therapies (which, again, lacking predictive biomarkers, we generally pursue in order of escalating cost). She understands that once all test results are available about a month from now, I will write an addendum to this note (to again be distributed to all of her physicians listed below) providing my interpretation of the results and recommendations for next steps.    Given that she did not appear to be in any particular distress and seemed to be somewhat nonchalant regarding her chronic pain (probably because of just how chronic a problem it is and the fact that her ibuprofen and other analgesics currently keep it at a manageable/tolerable level), and given that she appears to have already failed a trial of at least one combination of H1 and H2 antihistamines bid, I did not recommend any other empiric therapy for her today, though if we do confirm the diagnosis, the first order of therapeutic business here will be to have her try the other non-sedating H1 blockers as well as at least one other H2 blocker.  Some patients find that alternative H1 or H2 blockers serve them better, some patients find that tid dosing serves them better than bid dosing, and some patients find that slightly higher dosages (e.g., 20-30 mg rather than 10 mg of cetirizine, or 150 mg rather than 75 mg of ranitidine) serve them better than lower dosages.  Eventually, she will need to \"experiment,\" taking 2-4 weeks with each specific combination of drug/dose/frequency to understand what it can accomplish for her in controlling this disease of naturally waxing/waning intensity. If we find she has MCAS, we will also need to caution her that MCAD/MCAS patients have quite a predilection to adversely react not necessarily to the active ingredients in their medications but " "rather to the excipients (fillers, binders, dyes, preservatives, etc.) in their medications. As such, if she experiences an adverse reaction very shortly after beginning an ordinarily well tolerated medication (as is certainly the case with the H1 and H2 blockers), excipient reactivity must be suspected and she should promptly work with her pharmacist to review the medication's ingredient list, try to identify the likely offending ingredient, and try one or more alternative formulations of the medication which don't share any of the offending formulation's excipients.    It will be difficult (given that my schedule is now 100% booked for the next year) for me to see her back once the test results are back in a month or so, let alone to manage her through the \"tactical maneuvers\" of trying various medications to gain better control over the disease.  I am aware that Dr. Christianson has been acquiring a good degree of experience and facility with managing MCAS patients through trials of the various therapies known to help them, so we will plan on having the patient principally follow with Dr. Christianson for these \"tactical\" medication trials once a definitive diagnosis has been established.  The patient will next return here in about 2-3 months to discuss her test results and general notions of the disease and the approach to treatment, but I noted to her that since I expect we'll have test results in about 4-5 weeks and I'll send my addendum back to Dr. Christianson at that time, it's quite possible she may already have started treatment with Dr. Christianson by the time she next sees me.  I anticipate that after her next visit here, I'll be seeing the patient roughly annually for \"strategic\" reassessments.    As is usually the case with initial consultations for mast cell disease, this was a long encounter, 100 minutes in total, with 60 minutes spent in counseling. The patient indicated that all of her questions at this time had been " "answered to her satisfaction, and she expressed appreciation for the time I had given her.      Typed, reviewed, and electronically signed by: Spencer Jennings M.D.     DT: 01/25/2017 09:08 PM    cc: Lucia Christianson M.D. ([address] [address], [phone], fax [phone])      Addendum (5/20/17): For reasons not clear to me, the patient still has not pursued the diagnostic blood and urine testing for MCAS I had ordered at her initial visit here four months ago.  However, I did receive this past week the results of  staining of some of her old biopsies, and they indeed suggest we're headed in the right direction.    Although the  staining of a hepatic flexure colon polyp obtained in 12/11 (originally read as a tubular adenoma) was unremarkable (10-15 mast cells per high power field; the upper limit of normal in the GI and  tracts for this parameter is considered by most pathologists, in my experience, to be 20/hpf), the  staining of the \"cervix with inflammation and reactive changes\" seen in the hysterectomy specimen from 4/08 showed up to 55 mast cells per high power field.    However, it's not called a mast cell *activation* syndrome for nothing, and I think it will be important to find at least some evidence (i.e., elevated levels in blood and/or urine of mediators relatively specific to the mast cell) of mast cell activation to truly clinch the expected diagnosis here of MCAS.    I will ask my clinic nurse coordinator, Kayleen Carrington RN, to contact the patient to discuss these findings.  Perhaps she has undergone the desired testing elsewhere and already has diagnostic results, but if not, then the testing previously ordered here should still be pursued (again, taking care to attend to all of the previously noted precautions).    I will be happy to write and distribute another addendum once " results from the desired tests are all available.    I spent another 20 minutes reviewing the above-noted results and writing this addendum and messaging Ms. Carrington, but as none of these activities involved face-to-face time with the patient, no additional billing was submitted.      Typed, reviewed, and electronically signed by: Spencer Jennings M.D.     DT: 05/20/2017 01:25 PM    cc: Lucia Christianson M.D. ([address] [address], [phone], fax [phone])    Spencer Jennings MD

## 2017-01-25 NOTE — TELEPHONE ENCOUNTER
HPI:     PAST MEDICAL HISTORY:   Past Medical History     Past Medical History    Diagnosis  Date       Anxiety         Depression         Chiari malformation         Sepsis (H)          after dental procedure age 19       Arthralgia             CURRENT MEDICATIONS:   Current Outpatient Prescriptions     Current Outpatient Prescriptions    Medication  Sig  Dispense  Refill       FLUoxetine 20 MG tablet  Take 20 mg by mouth daily           gabapentin (NEURONTIN) 100 MG capsule  Take 100 mg by mouth 3 times daily 100 mg in morning and afternoon, and 300 mg at night           oxyCODONE-acetaminophen (PERCOCET) 5-325 MG per tablet  Take 1 tablet by mouth every 4 hours as needed for moderate to severe pain           CYCLOBENZAPRINE HCL PO  5-10 mg as needed every 8 hours.               PAST SURGICAL HISTORY:   Past Surgical History     Past Surgical History    Procedure  Laterality  Date       Decompression chiari    2000       Dejon/bso            for DUB       Thigh mass excision    5/10/2008        most likely a lipoma       Anterior neck fusion           Vocal cord nodule            when she was in high school       Appendectomy           Release trigger finger            right thumb, right ring finger       C/section, low transverse            placenta previa           ALLERGIES:      Allergies    Allergen  Reactions       Chantix [Varenicline]          Depression.       Penicillins  Hives       Codeine  Itching       Gabapentin         Lyrica [Pregabalin]         Tramadol         Erythromycin  Rash        FAMILY HISTORY:            SOCIAL HISTORY:  Social History    Substance Use Topics       Smoking status:  Current Every Day Smoker       Smokeless tobacco:  Not on file       Alcohol Use:  Not on file        ROS:   Constitutional: No fever, chills, or sweats. Weight stable.    ENT: No visual disturbance, ear ache, epistaxis, sore throat.    Cardiovascular: As per HPI.    Respiratory: No cough, hemoptysis.     GI: No  nausea, vomiting, hematemesis, melena, or hematochezia.    : No hematuria.    Integument: Negative.    Psychiatric: Negative.    Hematologic:  Easy bruising, no easy bleeding.  Neuro: Negative.    Endocrinology: No significant heat or cold intolerance    Musculoskeletal: No myalgia.    Exam:  There were no vitals taken for this visit.  GENERAL APPEARANCE: healthy, alert and no distress  HEENT: no icterus, no xanthelasmas, normal pupil size and reaction, normal palate, mucosa moist, no central cyanosis  NECK: no adenopathy, no asymmetry, masses, or scars, thyroid normal to palpation and no bruits, JVP not elevated  RESPIRATORY: lungs clear to auscultation - no rales, rhonchi or wheezes, no use of accessory muscles, no retractions, respirations are unlabored, normal respiratory rate  CARDIOVASCULAR: regular rhythm, normal S1 with physiologic split S2, no S3 or S4 and no murmur, click or rub, precordium quiet with normal PMI.  ABDOMEN: soft, non tender, without hepatosplenomegaly, no masses palpable, bowel sounds normal, aorta not enlarged by palpation, no abdominal bruits  EXTREMITIES: peripheral pulses normal, no edema, no bruits  NEURO: alert and oriented to person/place/time, normal speech, gait and affect  VASC: Radial, femoral, dorsalis pedis and posterior tibialis pulses are normal in volumes and symmetric bilaterally. No bruits are heard.  SKIN: no ecchymoses, no rashes    Labs:  CBC RESULTS:    Lab Results    Component  Value  Date      WBC  7.6  10/11/2015      RBC  4.22  10/11/2015      HGB  13.5  10/11/2015      HCT  40.4  10/11/2015      MCV  96  10/11/2015      MCH  32.0  10/11/2015      MCHC  33.4  10/11/2015      RDW  13.1  10/11/2015      PLT  151  10/11/2015        BMP RESULTS:  Lab Results    Component  Value  Date      NA  142  10/11/2015      POTASSIUM  3.7  10/11/2015      CHLORIDE  110*  10/11/2015      CO2  26  10/11/2015      ANIONGAP  6  10/11/2015      GLC  104*  10/11/2015      BUN  13   10/11/2015      CR  0.67  10/11/2015      GFRESTIMATED  >90  Non  GFR Calc    10/11/2015      GFRESTBLACK  >90   GFR Calc    10/11/2015      NIKHIL  8.3*  10/11/2015          Procedures:    ECHOCARDIOGRAM: 10/19/2014    CONCLUSION:    Family history of connective tissue disease. Normal intracardiac anatomy. Normal ventricular size and contractility. No evidence of mitral valve abnormalities or dilated aortic root. Normal echocardiogram.      M-Mode Report        Left Atrium 29    mm        Aortic Root  21  mm         LV end Diastolic  49   mm        LV end Systolic  29   mm            Shortening Fraction     42%        Intravent Septum   9 mm        LV Post Wall  7  mm                            1. ECG shows normal sinus rhythm with a rate of 81 bpm.  2. Normal left atrial dimension.  3. Normal left ventricular dimension and contractility.    2-Dimensional Report  Recordings are performed from parasternal, apical, subcostal and suprasternal notch windows. Echocardiographic images are suboptimal due to poor acoustic windows. Anatomic relationships are normal. Mitral, pulmonary and tricuspid valve motions are normal. The number of commissures of the aortic valve were not well seen.  The atrial septum appears intact. Left atrial size is normal. Right atrial size is normal. Left and right ventricular size and contractility are normal. The ventricular septum appears intact. There is no evidence of pericardial effusion. The aortic arch was not well seen. The ascending at the sinuses of Valsalva has a diameter of 30 mm (95th percentile for body surface area is 34 mm), and at the sinotubular ridge measures 25 mm (95th percentile for body surface area is 29 mm).     Doppler Report  Color flow and spectral Doppler are utilized. There is trivial mitral regurgitation. There is trivial tricuspid insufficiency, peak velocity was not obtainable.  Normal flows are recorded in the right ventricular  outflow tract, main pulmonary artery, ascending aorta and in the descending thoracic and abdominal aorta. There is trivial aortic insufficiency. There is no evidence of a left-to-right shunt at atrial, ventricular or great artery levels.             Assessment and Plan:

## 2017-01-25 NOTE — PROGRESS NOTES
RN met with pt today; introduced self and the care coordinator role.    Gave pt written instructions for 24 hour urine testing.  Reviewed instructions including importance of keeping specimen refrigerator cold during the entire collection process and transportation re: several of the mediators being tested for do not last more than 1 or 2 minutes in blood or urine at room temperature.  Answered pt's questions about collection & transportation process.  Reviewed common to have to repeat testing because difficult to test for mediators.  Also reviewed negative tests do not mean pt does not have a mast cell disease.  Per Dr. Jennings, pt had cervical biopsy in April of 2008 thru a [place] facility that he wants stained for .  Also pt had colonoscopy in [facility] about 5 years ago that he wants stained.  Per conversation with pt, procedure was in [place] and may have been at [facility] (or [facility]).  Pt signed release of information for us to obtain slides/tissue for  staining.  Also gave pt Ailyn Aquino's phone number as she was asking about insurance coverage of testing.  She stated that Dr. Jennings was in network.  RN told pt that likely testing would be covered if Dr. Jennings is in network but also encouraged she discuss with Ailyn are her costs.  RN also sent message to Ailyn with pt's permission.

## 2017-01-25 NOTE — LETTER
"  2017      RE: Laverne Irvin  [address] [facility] [address]       Patient: Laverne Irvin (MRN [id number])   Encounter Date: 2017  Referring: Lucia Christianson M.D. ([address] [address], [phone], fax [phone])  Attending: Spencer Jennings M.D.     Chief Complaint/Reason for Referral: Second opinion regarding possible mast cell activation disease (MCAD).    History of Present Illness: This 56 year old  white A1 medically disabled (on SSDI since 7/15)  is kindly referred in consultation regarding the above-noted issue.  At the beginning of the encounter, I reviewed all available chart data, consisting principally of about 30 notes and a handful of test results in her electronic chart here dating back to , the salient points of which I've incorporated into the narrative below.  The history here is a bit complex, and it's probably best to just present it all chronologically, blending HPI and PMH as follows.  I should note here at the outset, too, that the patient obviously was afflicted by a good bit of cognitive dysfunction and was having a fair bit of trouble with her remote and recent memory.  She kept apologizing for having such difficulty recalling and recounting her history.  She provided a rather \"scattershot\" history, and I've done my best here to reconstruct it into chronological order, but it's possible I might be missing some significant events or have some events out of order.  Her history of chronic multisystem polymorbidity of general themes of inflammation, allergy, and aberrant growth and development in various tissues actually date back to her earliest memories, recalling that she has never been able to sweat and thus can't bask in the sun lest she quickly get \"overheated.\"  She also recalls she has \"always\" (again, dating to her earlier " "memories) suffered an array of allergies, requiring special soaps and being intolerant of make-up and jewelry and a wide assortment of fragrances and chemical odors.  She doesn't recall any other health issues from childhood but knows that migraines started at 13, and when menarche came at 14, she was immediately beset by \"horrible\" dysmenorrhea and menorrhagia, not uncommonly even causing incidents of helga syncope, all of which continued until her first pregnancy.  She also recalls other problems beginning in early to mid adolescence including an unusual extent of exercise-induced fatigue, many episodes of bronchitis (since about 15) and sinusitis (since about 16).  She says she was \"constantly\" tachycardic since some point in adolescence, and an assortment of medications tried for this never helped much.  She also recalls she has been constipated \"forever,\" though in more recent years this has alternated with diarrhea.  She also recalls suffering gastroesophageal reflux disease for many years and simply cannot remember how far back this began.  She knows she suffered an episode of \"toxic shock syndrome\" in high school.  Her first pregnancy came at 27, notable only for prolonged labor and her  daughter having a heart condition of some sort.  Her second pregnancy came at 32 and went smoothly.  Her third pregnancy came at 33 and was miscarried early; no cause was sought or identified.  Her fourth (and final) pregnancy came at 36 and resulted in placenta previa requiring bedrest for many weeks with delivery by emergency  at 30 weeks, and then the  wound dehisced and had to heal by secondary intention.  Soon after this last pregnancy she began developing neck pain, and at 39 a Chiari malformation was diagnosed as the cause of this.  She underwent decompression surgery which did help initially -- she says it was a \"lifesaver,\" as previously she had been \"crippled\" by the pain -- but over time " "symptoms have slowly relapsed.  Bladder surgery of some sort (possibly a sling?) came around age 41, followed by total abdominal hysterectomy and bilateral salpingo-oophorectomy at 47 and resection of a large thigh lipoma a month later.  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  She was seen for this by Dr. Lucia Christianson, who did diagnose EDS3; genetic testing for other forms of EDS and other connective tissue disorders was all negative.  Dr. Christianson also came to suspect the patient might have a mast cell activation syndrome, leading to the present evaluation.  Unfortunately, she did not receive the letter we usually send to patients ahead of their appointments advising cessation of NSAIDs/salicylates and PPIs if possible, and the patient actually has continued taking her regular NSAID and PPI medications right through to this morning.  She says her chief complaint is diffusely migratory pain.    On a full review of systems, although she denies any issues with flushing, epistaxis, easy bleeding, irritation of the nose or mouth or throat, or adenopathy, she endorses a wide range of other, chronic/recurrent, episodic/intermittent and/or waxing/waning issues including subjective (rarely objective) fevers, feeling cold much of the time, constant marked fatigue (often to the point of exhaustion), malaise, headaches, diffusely migratory aching/pain, diffusely migratory pruritus, lifelong anhidrosis, unprovoked fluctuations in weight and appetite, irritation of the eyes, frequent acute brief inability to focus vision, slight hearing loss, tinnitus, easy bruising, \"constant\" sinonasal congestion, occasional coryza, occasional post-nasal drip, occasional proximal dysphagia, dyspnea, occasional palpitations, non-anginal central chest discomfort/pain, gastroesophageal reflux, " "occasional nausea, rare vomiting, diarrhea alternating with constipation, diffusely migratory abdominal discomfort including (usually post-prandial) bloating, urinary frequency and retention, diffusely migratory (but principally just left-sided) weakness, edema largely limited to the left face, diffusely migratory tingling/numbness (typically about the distal extremities), orthostatic and non-orthostatic presyncope, syncope in past as noted above, cognitive dysfunction (particularly memory, concentration, and word-finding), hair loss, deterioration of dentition despite good attention to dental hygiene, brittle nails, diffusely migratory rashes (typically patchy macular erythema), hives (principally just in reaction to certain medications), insomnia, early waking, anxiety, depression, diffuse joint hypermobility, and poor healing in general.  Complete ROS o/w neg.    Family history is notable for a father with chronic obstructive pulmonary disease (he had smoked a long time), atrial fibrillation, congestive heart failure, \"non-stop\" itching (especially about his back, where there also are \"lots of moles\"), easy bruising, \"lots of hernias\" including a hiatal hernia, S/P CABG, bladder cancer, and gastroesophageal reflux disease, a mother with chronic bronchitis and sinusitis, episodes of pneumonia, S/P myocardial infarction, episodes in which her \"throat closes,\" vocal cord polyps (she, too, is a smoker), three strokes, diffuse joint hypermobility, anxiety, tremor, and poor healing, two daughters with Chiari malformation which has been surgically decompressed, a ventricular septal defect in her oldest daughter which had spontaneously closed by age 2, EDS3 and dysmenorrhea and menorraghia in all three of her daughters, spells of anxiety, tremor, dyspnea, and palpitations in her youngest child who also suffers migraines, \"eye convergence issues,\" \"lots of dental problems,\" and who has had some concussions, skeletal " "injuries and \"never-ending ankle issues\" in her middle daughter, and a brother with HIV. The patient has smoked up to 2 packs of cigarettes a day from 18 to the present, but she denies any history of significant alcohol use (in fact, just a slight bit causes nausea and presyncope) or illegal substance use except for some experimentation in college plus frequent marijuana use which, most interestingly, \"helepd me get through high school.\"  She notes she has been referred for a trial of medical marijuana.    She lists her current medications as Flonase prn, Senokot-S prn, fluoxetine 60 mg qd, ibuprofen 800 mg bid, omeprazole 20 mg qd, tizanidine 2 mg qhs, acyclovir 400 mg prn, cetirizine 10 mg prn allergies, vitamin D 1000 units qd, Sudafed 60 mg bid, and Percocet prn.  She notes Dr. Christianson previously had empirically tried her on Zyrtec and Zantac bid, but this did not appear to help any of her symptoms, and she stopped taking them regularly quite some time ago.  She lists her current allergies as severe depression to Chantix, hives to penicillins, itching to codeine, fatigue to Cymbalta, gabapentin, and Lyrica, itching to morphine, nightmares to topiramate, emotional lability to tramadol, and rash to erythromycin.    Exam finds a mildly overweight woman appearing her stated age and in no acute distress, obvious smoker's voice but no odor of smoke about her at the moment, pleasant, cooperative, fully alert and oriented, independently (if just a bit slowly/achily) ambulatory, presenting by herself.  Vitals per chart, notable solely for weight 178 pounds.  Key findings are her comfortable general appearance, HEENT benign but for fair dentition (and, as I often see in EDS3, she can open her mouth to an unusually wide degree), no plethora/pallor/diaphoresis/jaundice/rash/bleeding/bruising, neck supple, no JVD or thyromegaly or carotid bruits, no palpable adenopathy or tenderness at any of the usual node-bearing sites, " clear lungs, regular heart (though with curiously soft heart sounds) with no adventitious sounds, abdomen a bit overweight but otherwise benign, a bit of tenderness on palpation over the mid-thoracic spine, no peripheral edema, neuro grossly intact but for her report of chronic tingling/numbness in the distal left extremities.  On a light scratch test on the upper back, a remarkably minimal degree (compared to what I usually see in MCAD/MCAS) of dermatographism (erythroderma only, no hives) emerged but was fully sustained when last checked 10 minutes later.      Review of available lab data was notable for routine blood counts and chemistries (except for mild hypoproteinemia and hypoalbuminemia) and urinalysis in '15.  Interestingly, the pathology on the hysterectomy noted inflammation at the cervix.  The patient says she had a colonoscopy about five years ago at some facility in the [facility] and knows that some sort of mild abnormality was found, but she doesn't know whether any biopsies were obtained.    A/P: Kudos to Dr. Christianson, because I think she's likely right in her suspicion that a mast cell activation disorder (MCAD) -- and far more likely the far more prevalent (if only recently recognized) type termed mast cell activation syndrome (MCAS) than the rare (but long recognized) type termed mastocytosis -- is at the root of most or all of the patient's chronic multisystem polymorbidity of general themes of inflammation, allergy, and aberrant growth and development in various tissues (though obviously the COPD she has to have to some degree at this point is mostly or all due to her smoking, and it's imperative she stop smoking if for no other reason than that it's a potent mast cell activator). Beyond all the other diseases already ruled out by the great extent of testing she's undergone to date, I don't know of any other human disease that comes anywhere near as close as MCAS does to accounting, directly or  indirectly, for the full range and chronicity of all the symptoms and findings here. (Of note, too, I'm not saying that any of her prior diagnoses are wrong. It's just that each of them accounts for only one subset or another of all that's gone on in her, while MCAS can account for most or all of everything that's been going on in her.) All of that said, she needs objective laboratory evidence of improperly behaving mast cells, toward which end I ordered the range of testing I typically check in assessing for MCAS, namely, a CBCD, CMP, magnesium, chilled serum tryptase and chromogranin A, chilled plasma prostaglandin D2 and histamine and heparin, chilled random urinary prostaglandin D2 and N-methylhistamine and leukotriene E4 and 2,3-dinor 11-beta-prostaglandin-F2-alpha, and chilled 24-hour urinary prostaglandin D2 and N-methylhistamine and leukotriene E4 and 2,3-dinor 11-beta-prostaglandin-F2-alpha. I also ordered an anti-IgE IgG and an anti-IgE receptor antibody, which won't be useful diagnostically but may influence certain therapeutic decisions down the road if MCAS is proven. However, since she has been using her ibuprofen and omeprazole up to this morning, I asked her to stop these drugs (and any other NSAIDs, salicylates, and PPIs) if possible and return in a week to provide blood and urine samples.  We provided her careful written and verbal instructions regarding the 24-hour urine collection including the importance of continuous chilling of the specimen throughout collection and transport.  She understands the various reasons why a single round of  testing might yield all negative results, and she understands that if this happens, it of course doesn't even begin to invalidate/refute/negate even a single element of her history (which strongly argues for a mast cell disorder). Her history is what it is, so if we get a negative round of testing, I'll simply recommend repeat testing (which she'll of  "course need to pursue locally), albeit with specimen collection at that point preferably deferred to a particularly symptomatic day. I also asked her to work with our clinic nurse coordinator, Kayleen Carrington RN, to try to arrange for her old colonic biopsies (if any) to be reassessed (either locally or here) with  staining (and we'll do the same here for the resected cervical tissue which showed inflammation), as mast cells can't be seen with routine H&E staining and I've often seen \"normal\" or \"mildly chronically inflamed\" GI tract biopsies in MCAS patients \"light up\" on  staining revealing mast cell disease. If 2-3 rounds of blood and urine testing yields all negative results, and if restaining of any available old biopsies either can't be done for some reason or yields negative results, the \"backup plan\" will be to pursue a fresh set of bidirectional endoscopies to obtain biopsies throughout the luminal GI tract for pathologic evaluation specifically (using  staining at a minimum) for increased (>20/hpf) numbers of mast cells (as often seen mildly-moderately in MCAS, though not to anywhere near the degree (or patterns) seen in mastocytosis).    Although I cautioned her that she doesn't have a definitive diagnosis of MCAS yet, we did engage in extended discussion today about general aspects of MCAS including its essential nature of chronic multisystem polymorbidity of a generally inflammatory theme, the availability of many therapies shown helpful in various MCAD/MCAS patients, the good likelihood of (eventually) finding therapy that will help her achieve the goal of feeling significantly better than the pre-treatment baseline the majority of the time, and the present frustrating absence of any biomarkers that can help predict which of the many available, potentially helpful therapies is most likely to benefit the individual patient. She understands that if we are able to secure a diagnosis " "of MCAS, she will be dependent on pursuing -- in patient, persistent, and methodical fashion, trying as hard as possible to make just one change at a time -- trials of the various therapies (which, again, lacking predictive biomarkers, we generally pursue in order of escalating cost). She understands that once all test results are available about a month from now, I will write an addendum to this note (to again be distributed to all of her physicians listed below) providing my interpretation of the results and recommendations for next steps.    Given that she did not appear to be in any particular distress and seemed to be somewhat nonchalant regarding her chronic pain (probably because of just how chronic a problem it is and the fact that her ibuprofen and other analgesics currently keep it at a manageable/tolerable level), and given that she appears to have already failed a trial of at least one combination of H1 and H2 antihistamines bid, I did not recommend any other empiric therapy for her today, though if we do confirm the diagnosis, the first order of therapeutic business here will be to have her try the other non-sedating H1 blockers as well as at least one other H2 blocker.  Some patients find that alternative H1 or H2 blockers serve them better, some patients find that tid dosing serves them better than bid dosing, and some patients find that slightly higher dosages (e.g., 20-30 mg rather than 10 mg of cetirizine, or 150 mg rather than 75 mg of ranitidine) serve them better than lower dosages.  Eventually, she will need to \"experiment,\" taking 2-4 weeks with each specific combination of drug/dose/frequency to understand what it can accomplish for her in controlling this disease of naturally waxing/waning intensity. If we find she has MCAS, we will also need to caution her that MCAD/MCAS patients have quite a predilection to adversely react not necessarily to the active ingredients in their medications but " "rather to the excipients (fillers, binders, dyes, preservatives, etc.) in their medications. As such, if she experiences an adverse reaction very shortly after beginning an ordinarily well tolerated medication (as is certainly the case with the H1 and H2 blockers), excipient reactivity must be suspected and she should promptly work with her pharmacist to review the medication's ingredient list, try to identify the likely offending ingredient, and try one or more alternative formulations of the medication which don't share any of the offending formulation's excipients.    It will be difficult (given that my schedule is now 100% booked for the next year) for me to see her back once the test results are back in a month or so, let alone to manage her through the \"tactical maneuvers\" of trying various medications to gain better control over the disease.  I am aware that Dr. Christianson has been acquiring a good degree of experience and facility with managing MCAS patients through trials of the various therapies known to help them, so we will plan on having the patient principally follow with Dr. Christianson for these \"tactical\" medication trials once a definitive diagnosis has been established.  The patient will next return here in about 2-3 months to discuss her test results and general notions of the disease and the approach to treatment, but I noted to her that since I expect we'll have test results in about 4-5 weeks and I'll send my addendum back to Dr. Christianson at that time, it's quite possible she may already have started treatment with Dr. Christianson by the time she next sees me.  I anticipate that after her next visit here, I'll be seeing the patient roughly annually for \"strategic\" reassessments.    As is usually the case with initial consultations for mast cell disease, this was a long encounter, 100 minutes in total, with 60 minutes spent in counseling. The patient indicated that all of her questions at this time had been " "answered to her satisfaction, and she expressed appreciation for the time I had given her.      Typed, reviewed, and electronically signed by: Spencer Jennings M.D.     DT: 01/25/2017 09:08 PM    cc: Lucia Christianson M.D. ([address] [address], [phone], fax [phone])      Addendum (5/20/17): For reasons not clear to me, the patient still has not pursued the diagnostic blood and urine testing for MCAS I had ordered at her initial visit here four months ago.  However, I did receive this past week the results of  staining of some of her old biopsies, and they indeed suggest we're headed in the right direction.    Although the  staining of a hepatic flexure colon polyp obtained in 12/11 (originally read as a tubular adenoma) was unremarkable (10-15 mast cells per high power field; the upper limit of normal in the GI and  tracts for this parameter is considered by most pathologists, in my experience, to be 20/hpf), the  staining of the \"cervix with inflammation and reactive changes\" seen in the hysterectomy specimen from 4/08 showed up to 55 mast cells per high power field.    However, it's not called a mast cell *activation* syndrome for nothing, and I think it will be important to find at least some evidence (i.e., elevated levels in blood and/or urine of mediators relatively specific to the mast cell) of mast cell activation to truly clinch the expected diagnosis here of MCAS.    I will ask my clinic nurse coordinator, Kayleen Carrington RN, to contact the patient to discuss these findings.  Perhaps she has undergone the desired testing elsewhere and already has diagnostic results, but if not, then the testing previously ordered here should still be pursued (again, taking care to attend to all of the previously noted precautions).    I will be happy to write and distribute another addendum once " "results from the desired tests are all available.    I spent another 20 minutes reviewing the above-noted results and writing this addendum and messaging Ms. Carrington, but as none of these activities involved face-to-face time with the patient, no additional billing was submitted.      Typed, reviewed, and electronically signed by: Spencer Jennings M.D.     DT: 05/20/2017 01:25 PM    cc: Lucia Christianson M.D. ([address] [address], [phone], fax [phone])      Addendum (6/26/17): Kudos to Dr. Christianson, as she has been proven right: labs are back and are handily diagnostic.  As is commonly seen in this work-up which necessarily casts a fairly wide net over the range of clinically testable mediators which are relatively specific to the mast cell, most of the tests were normal (or virtually so), but the multiple mast cell  levels detailed below were positive.      Therefore, based on (1) a clinical history highly consistent with chronic/recurrent aberrant mast cell  release, (2) multiple elevated mast cell  levels in 6/17 (random urinary leukotriene E4 163 pg/mg Cr (normal <= 104), 24-hour urinary 2,3-dinor-11-beta-prostaglandin-F2-alpha 5463 pg/mg Cr (normal <= 5205; note, too, the random urinary assessment of this metabolite was *almost* elevated at 5200), 24-hour urinary prostaglandin D2 330 ng/24h (normal 100-280)) (but a normal serum tryptase, largely ruling out systemic mastocytosis), (3) increased mast cells (up to 55/hpf, normal generally thought to be <20) on  staining of the \"cervix with inflammation and reactive changes\" seen in the hysterectomy specimen from 4/08 showed up to 55 mast cells per high power field, (4) absence of any other evident disease better accounting for the full range and chronicity of all the symptoms and findings in the case, and (5) at least partial " "response to mast-cell-targeted therapy (e.g., antihistamines, NSAIDs, vitamin D), mast cell activation syndrome (MCAS; not systemic mastocytosis) is the underlying, unifying diagnosis (per Shantel et al., J Hematol Oncol 2011) for the patient's virtually lifelong complex of multisystem polymorbidity of general themes of inflammation, allergy, and aberrant tissue growth/development.  Of note, again, I don't think any of her prior diagnoses are wrong (other than any assertions of psychosomatism that might have been made along the way), but the diagnosis that seems to best underlie/unify the largest portion (potentially even all) of her large array of past and present issues is MCAS, and therefore treatment efforts directed at such would seem to be warranted.  To be sure, all of her physicians must maintain vigilance for other processes for which -- again, whether ultimately dependent on, or completely independent of, her MCAS -- standard therapies other than mast-cell-directed therapy have been defined, as such standard therapies would of course be the more appropriate choices for such processes.  However, with MCAS now having been defined in this patient per published diagnostic criteria, emergence of a new process that is potentially consistent with MCAS can be reasonably attributed to MCAS once other \"routine\" evaluation for that process has ruled out other, \"traditional\" causes for such a process.      I'd like to briefly address just a bit more why this is MCAS and not mastocytosis. 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 Her normal tryptase, too, is far more consistent with MCAS and makes systemic mastocytosis (SM) quite unlikely (not impossible, but quite unlikely). The rare normal-tryptase (or minimally-elevated-tryptase) SM virtually always is the indolent form of SM (ISM), and to the best of our present knowledge, there's no difference in the prognosis of, or the therapeutic approach toward, ISM vs. MCAS. Therefore, even if we're missing the uncommon normal-tryptase ISM by not pursuing more biopsies of various extracutaneous tissues, there would seem to be nothing lost by \"misdiagnosing\" her with MCAS. (There certainly are no clinical or laboratory features here to suggest a comorbid hematologic malignancy.) I'll note, too, that transformation of ISM to aggressive SM (ASM) is rare, and transformation of MCAS to any form of SM has in fact never been reported yet in the time since the first case reports of MCAS were published in '07.      As such, we can reasonably confidently exclude mastocytosis as the issue here and I don't think marrow examination is warranted, and until somebody figures out another diagnosis that even *better* accounts for all that has gone on in her, it seems reasonable to go with MCAS as the best root operating diagnosis here.      What I'd like to do at this point in the discussion is provide a range of general information about MCAS and its therapy.      I will note that it's of course possible that the mast cell activation found in her is purely secondary (to some unknown other chronic inflammatory disease) rather than primary (mutational) -- it will require mutational analysis that won't be available in the clinical laboratory for at least another 5-10 years before such a distinction can be routinely made -- but I will assert, again, that she meets all current diagnostic criteria for MCAS and MCAS is the best unifying explanation at present for her large assortment of problems. As treatments " for other (less than unifying) diagnoses have generally not yielded substantial clinical improvement to date, and since a diagnosis of MCAS has now been made per current diagnostic criteria, it would seem that treatment efforts directed at MCAS are warranted (presuming she is less than wholly satisfied with the gains she has made with her present regimen).      I feel it's important to comment on how a normal tryptase is not necessarily inconsistent with mast cell disease. Since its discovery in the 1980s, tryptase has been inexorably linked with mast cell disease and generations of physicians have been trained that it is virtually impossible to have mast cell disease unless serum tryptase is elevated -- but we now know this precept to be incorrect. Although initial studies of tryptase led to thinking its level reflected the total body mast cell activation state, in the last decade there has been a sea change in this understanding and now it is clearly understood (and even publicly acknowledged by tryptase's discoverer) that the level of tryptase far dominantly reflects the total number of mast cells in the body and far less the total body mast cell activation state. As such, one would expect to find the significantly increased tryptase level typically found in >80% of systemic mastocytosis patients, while in MCAS (where there is little to no increase in the numbers of mast cells) one would expect to find little to no increase in the tryptase level, and when no increase can be found in tryptase, one typically has to find evidence of mast cell activation by examining other relatively specific mast cell mediators, a tricky business given their typically very short half-lives and great thermolability.      Once diagnosis of MCAS is established, there are initial questions/issues about natural history and prognosis.      Although for many reasons a unifying diagnosis of a mast cell disorder typically isn't suspected  "until decades after symptom onset, the chronic multisystem polymorbidity of general themes of inflammation   allergy   aberrant tissue growth/development that is MCAS tends to initially emerge by adolescence or early adulthood but often happens as early as childhood or even infancy, this last a scenario in which the rare inborn autoinflammatory syndromes (AISs) need to be considered in the differential diagnosis, especially since (1) recent molecular investigations in the AIS area have been discovering that some of them actually are specific variants of MCAS and (2) highly specific (and effective) drugs exist for some of these syndromes. MCAS tends to \"step up\" its baseline symptoms at irregular intervals, generally shortly after a major (physical and/or psychological) stressor (e.g., trauma, major infection, surgery, distant travel with novel antigenic exposure, job loss or other severe financial strain, death of a loved one, etc. etc. etc.). In the absence of formal study yet of this issue, present best estimates of survival in MCAS are for a normal lifespan (akin to what's been shown in studies of indolent SM) and that a wide variety of medications have been shown effective in various MCAS patients. Although there appears to be a very low rate of transformation of indolent SM to more aggressive forms of SM, there has not yet been a single reported case (nor I have observed or heard of a single case) of MCAS transforming to any form of mastocytosis. At present, with no objective markers of therapeutic response having been developed yet (and which may be quite difficult to develop given the extreme heterogeneity of the clinical presentation of the disease), the goal of therapy is entirely subjective, namely, feeling significantly better than the pre-treatment baseline the majority of the time. Feeling \"perfect\" or feeling significantly better \"all the time\" is not a reasonable goal given the complexity of the " disease (see http://[email or web address].com/index.php/page/copelibrary?key=mast%20cells as one illustration of this point). Most MCAS patients are able to eventually identify significantly helpful therapy, but at present there are no published/validated biomarkers that can predict which therapies are most likely to benefit a given patient. As such, both patient and doctor must practice patience, persistence, and a methodical approach -- trying as hard as possible to make just one change in the regimen at a time -- in stepping through trials of various medications (generally proceeding in order of increasing cost given that no better scientifically informed strategy is presently available), retaining treatments which clearly provide significant benefit (which for most medications in this area can be determined within 1-2 months) and decisively stopping those which do not meet this high bar, lest unmanageable polypharmacy develop.      In my opinion, her early history was not burdened with multisystem inflammatory issues nearly as much as one typically sees in classic autoinflammatory syndromes, so I don't think it would be reasonable to pursue genetic testing for such at this time.  Of course, if she proves refractory to a number of MCAS-targeted therapies, the utility of such testing might then be increased.  Initial evaluation by a genetic counselor usually smooths the way toward insurance coverage of the testing for these conditions (e.g., the periodic fever syndrome 7-gene sequencing panel (code PRFEVERPAN) at [facility] in [place] (http://[email or web address]) or the similar (periodic fever syndrome) 21-gene sequencing panel at [facility] ([email or web address]://[email or web address])).      I think her normal plasma histamine and urinary N-methylhistamine levels make it pretty unlikely there's a deficiency in diamine oxidase (LUDY) or a poor-metabolizing mutation of histidine N-methyltransferase (HNMT) here, and thus I think  testing for LUDY deficiency and mutational analysis of HNMT is not warranted.  Nevertheless, if these tests are desired, LUDY deficiency testing can be pursued via [facility] in [place], [place] ([email or web address]), and HNMT mutational analysis can be ordered as a single-gene analysis at various facilities (e.g., [facility] in [place], [place] ([email or web address])).      Current understanding of the genetics in MCAS is limited. Repeated preliminary datasets from the [facility] demonstrate the disease is clonal in essentially every patient, albeit vastly heterogeneously so, thereby likely explaining the vast clinical heterogeneity (and vastly heterogeneous therapeutic responsiveness) with which the disease presents. The [facility] team has also provided repeated preliminary datasets demonstrating the disease is likely epidemic (present in at least 5-10% of the general [place] population), unsurprising given increasing data suggesting various epidemic chronic idiopathic inflammatory diseases (e.g., chronic fatigue syndrome, fibromyalgia, irritable bowel syndrome) may well be merely assorted variants of MCAS. Other not-so-obviously-inflammatory (but nevertheless still likely inflammatory) diseases, too, may be assorted variants of MCAS, such as chronic idiopathic urticaria, idiopathic anaphylaxis, Nalini Danlos Syndrome Type III, postural orthostatic tachycardia syndrome (POTS), dysautonomia, autism, attention deficit hyperactivity disorder, etc. etc. etc. However, to be clear, none of these diseases has yet been proven to be forms of MCAS, and much research into this hypothesis is needed in each of these diseases. Furthermore, the [facility] team has clearly demonstrated the high familial predisposition of the disease in spite of the fact that the  mutations appear virtually always somatic/acquired (i.e., not germline/inherited), and relatively early in life at that. (It is known  "that the mutations driving aberrant MC function in any given affected patient in an affected swathi are different from the mutations in other affected members of the affected swathi, explaining why the different affected members of an affected swathi manifest somewhat different clinical presentations. The epigenetic effect of \"anticipation\" is also seen in mast cell disease kindreds, with later generations first manifesting the disease at earlier ages and with overall more severe phenotypes in later generations.) Preliminary data are just beginning to emerge that the reconciliation of these two superficially conflicting observations (familial predisposition vs. the somatic nature of the causative mutations) stems from recognition that most MCAS (and SM) patients also bear (inheritable) epigenetic mutations, and it is currently hypothesized that these epigenetic mutations create states of genetic fragility such that assorted biochemical signal patterns which flood the body in response to assorted (physical or psychological) stressors interact with such fragility states to create the actual genetic mutations in marrow stem cells or pluripotent progenitor cells which then \"trickle down\" to mast cells (and, to be sure, other lineages, but when the end clinical effects of such mutations are dominantly operant in the mast cell as opposed to other types of cells, it is reasonable to term the situation a mast cell activation syndrome). These genetic mutations then create states of not only constitutive mast cell activation but also aberrant mast cell reactivity, and the end clinical effects seen in any given MCAS patient are the net results of extremely complex networks of interactions among abnormal (and normal) MCs and abnormal (and normal) other cells.      As previously noted, there is a large array of drugs shown helpful in various MCAS patients, but \"Step 1\" in treating any mast cell disorder -- and I cannot " "overemphasize the importance of this step -- is identification (as specifically as possible) and avoidance of triggers (be they chemical/antigenic or physical such as cold, heat, ultraviolet light, etc.). Note medication excipients (e.g., fillers, binders, dyes, preservatives) often are triggers, and rapid adverse reaction to an ordinarily well tolerated drug in an MCAS patient is less likely a sign of intolerance of the active ingredient and far more likely a sign of an excipient reaction mandating prompt return to the pharmacist (commercial or compounding) to identify alternative formulations to be tried which do not contain any of the offending formulation's excipients. Adding a drug to the allergy list is unhelpful -- indeed, frankly counterproductive -- when the true offending agent is an excipient in the particular formulations of the drug that were tried. Offending excipients should be identified as specifically as possible, added to the allergy list, screened against the rest of the patient's present medication list, and screened against all medications prescribed in the future.    I should specifically note here that her detailed medication history indeed suggests that a number of adverse medication reactions have been excipient-directed rather than drug-directed, so consultation with her pharmacist, trying to identify the specific offending excipient(s) as described above, will be an important early maneuver for her.      \"Step 2\" in treating MCAS ordinarily is identifying an optimal full (H1 + H2) histamine receptor blockade as detailed in my original note above.  Most MCAS patients do much better with histamine receptor blockers given at least twice daily rather than once daily; some do even better with tid dosing rather than bid dosing. Some patients clearly do better with one particular H1 blocker compared to another, or one particular H2 blocker compared to another. Thus, experimentation with " different H1 and H2 blockers is reasonable to try to identify a particular optimal regimen. With the antihistamines, it usually takes only 2-4 weeks with each particular H1 blocker (at any given dose/frequency) or H2 blocker to identify (across the natural hourly/daily/weekly waxing/waning of symptoms from inappropriate mast cell activation) the benefits and toxicities of that specific regimen, allowing a decision at that point as to which dosing (or medication) change should be tried next. These drugs ordinarily are so well tolerated that if adverse reactions rapidly emerge, excipient reactivity is far more likely than reactivity against the drug molecule itself. In the [place] non-sedating H1 blockers that are commonly tried are loratadine (starting at 10 mg bid), cetirizine (starting at 10 mg bid), fexofenadine (starting at  mg bid), or levocetirizine (starting at 5 mg bid). H2 blockers that are commonly tried are famotidine (20-40 mg bid) and ranitidine ( mg bid); in much of [place] and certain other regions, rupatadine has been demonstrated to be a useful H1 blocker in mast cell disease. Through cautious experimentation, some MCAS patients discover that higher individual dosages (e.g., loratadine 20-30 mg instead of 10 mg) or higher dosing frequencies (e.g., tid instead of bid) bring them significantly greater benefit than lower dosages or frequencies. The patient who is taking too much H1 blocker almost always discovers that dosing is excessive when the typical anticholinergic toxicities are noted: newly (or significantly worse) dry eyes, dry sinuses, dry mouth, dry throat, urinary hesitancy, and/or constipation.      A few MCAS patients are so severely afflicted as to be in an essentially constant anaphylactoid state. Although I don't think this patient is anywhere close to the point of needing this treatment, I will note -- merely for *potential* future use in this patient, should she advance to the  point of suffering severe, chronically life-threatening and/or disabling disease proving refractory to a large number of other treatments -- that I have had success in some (now more than two dozen) very severely afflicted MCAS patients with continuous infusion of (preferably preservative-free) diphenhydramine. I typically start dosing (inpatient, to permit close monitoring) at 5 mg/hr and escalate in increments of 1-2 mg/hr with each flare of symptoms. Dosing above 15 mg/hr is likely to be futile and toxic, but I have now seen (though not yet had opportunity to publish beyond abstract form) excellent responses in the large majority (~90%) of a bit more than two dozen patients in whom I've now tried this, and all the responses have occurred in the 10-14 mg/hr range. Obviously, a semipermanent IV line (typically PICC, PASport, or Portacath) needs to be placed prior to discharge if this treatment is found helpful; note that some patients react to the materials in some lines, so sometimes more than one line needs to be tried. Even once an optimal chronic maintenance dose is identified, patients may continue to have occasional flares, for which bolus dosings of 10-25 mg via the pump are usually sufficient to regain control. Of note, in one patient in whom the infusion of preservative-free IV diphenhydramine seemed to trigger flares and who could not tolerate the infusion at more than 8 mg/hr, a trial of a different 's preservative-free IV diphenhydramine instantly resolved the intolerability and resulted in good response within the expected dosing window, thus demonstrating there had to have been an unacknowledged excipient or unrecognized contaminant in the first product tried; indeed, I then discovered at the FDA's website that the first product's  (ANKITA) had been repeatedly recently cited by the FDA for contaminated product and inactive product in some of its other IV products and also had been  cited for failing to respond to prior citations. (I have since gained similar experience with the allegedly preservative-free IV diphenhydramine product from [facility], too. In all of these few, severely afflicted patients who failed [facility] IV diphenhydramine, a trial of similar product from [facility] then proved successful.) In other words, although it is as theoretically possible to develop true allergy to diphenhydramine as to any other medication, ordinarily diphenhydramine is an extremely well tolerated drug, and thus even in a formulation which supposedly contains absolutely nothing but diphenhydramine and water, intolerance should raise more concern for excipient reactivity than diphenhydramine reactivity.      I will further note that I also have (unpublished, limited) clinical experience that addition of continuous famotidine infusion (2.5 mg/hr) to continuous diphenhydramine infusion (CDI) can provide clinically significant further disease control beyond what is seen in some patients with CDI together with intermittent (oral or IV) histamine H2 receptor blocker therapy.  Still, if it is ever determined that CDI is necessary in this patient, I would recommend starting it first and establishing a stable, beneficial dose before adding continuous famotidine (or ranitidine), so that the different benefits from the different infusions can be distinguished.      Once an optimal full histamine receptor blockade has been established (presuming such drugs help at all; note that the heterogeneity of the disease is such that one can't presume *any* mast-cell-targeted medication or medication class to necessarily prove effective in all MCAS patients), the question becomes what to try next. Given the lack of validated biomarkers at present to predict optimal therapy for the individual MCAS patient (see the note in the following paragraph for a bit more discussion on this important point), I tend to start  inexpensively and progress by cost as needed (though with occasional, carefully considered exceptions as noted below). Whenever possible, one intervention should be tried at a time, generally starting at a low dose and escalating step-wise in dose or frequency about every 1-2 weeks as tolerated so that a maximally tolerated dose and a maximally effective dose and frequency can be clearly identified. If the intervention is tolerated but significant benefit is not clearly identified after 4-8 weeks, the drug should be dropped and the patient should proceed to the next trial. When the medication that is significantly effective for a patient's particular variant of mast cell disease is found, the improvement is often so starkly apparent to the physician as he walks into the exam room at the next check-up in 1-2 months that sometimes words do not even need to be exchanged.      There is an understandable temptation to expect that elevated prostaglandins should ivy for likelihood to respond to NSAIDs, or that elevated leukotrienes should ivy for likelihood to respond to leukotriene receptor antagonists, or that elevated histamine or histamine metabolites should ivy for likelihood to respond to histamine receptor antagonists and/or diamine oxidase, etc. etc. etc., but the reality is that at present there simply are no data demonstrating such relationships. The mast cell is capable of producing and releasing more than 200 mediators, and the few we measure obviously are a very poor surrogate for the totality of the signalling chaos in the disease, plus, as noted above, there are preliminary data suggesting extreme mutational heterogeneity in multiple mast cell regulatory elements in essentially every patient with the disease, all but guaranteeing extreme heterogeneity in patterns of clinical presentation and patterns of therapeutic responsiveness.  Thus, while there's certainly nothing wrong in trying, for example, a  "leukotriene blocker as an early intervention in an MCAS patient with elevated leukotrienes, one should not draw any inferences at all (about, for example, the accuracy of the diagnosis) if the patient fails to respond to such \"logical\" therapy. The disease is simply far too complex for expectations of response to be that simple. Similarly, I should emphasize that the anecdotal observations I offer below (e.g., patients with diffusely migratory pain, especially bone pain in the legs, tend to respond to hydroxyurea, and patients with inappropriately \"normal,\" or even mildly elevated, H/H tend to respond to imatinib) are just that -- anecdotal -- and have not yet been published or otherwise subject to peer review, let alone put through formal evaluation to establish them as \"validated biomarkers.\" All I can offer at this point is my accumulated clinical experience in treating more than 1,000 MCAD patients (the vast majority with MCAS). This certainly will be important research to be done as funding for such can be obtained, but the bottom line at present is that research has not been done, so the physician treating an MCAS patient needs to keep in mind the great limitations on what's currently *reliably* known about MCAS.      On a matter that's different from, but nevertheless somewhat related to, the excipient issue, I should note that if the patient has any known drug-metabolizing-enzyme (DME) polymorphisms (e.g., in cytochrome p450 isozymes 2C9, 2C19, 2D6, or 3A4, all of which can be genotypically tested quite readily in the [place] dosing adjustments will be needed as appropriate for the patient's particular polymorphisms. Like MCAS itself, pharmacogenomic polymorphisms are far more prevalent (by available epidemiologic data) than physicians typically suspect. A poor-metabolizing DME polymorphism should be suspected (and the patient should be appropriately genotyped) if, after the patient has had some time on the " "drug at routine doses, a toxicity develops which is typically seen only at high doses of that drug; conversely, a rapid-metabolizing DME polymorphism should be suspected (and, again, the patient should be appropriately genotyped) if an ordinarily very reliable drug effect (e.g., INR increase with warfarin) is not seen with typical dosing. It is unknown whether there is a relationship between the genetic/epigenetic origins of mast cell disease and the genetic basis of DME polymorphisms, but I certainly have seen many DME polymorphisms in MCAS patients.     Significantly beneficial drugs obviously should be retained in the regimen beyond the trial period. There are no biomarkers at present to identify when the disease has come \"adequately\" under control. It is purely the patient's subjective judgment as to whether sufficient improvement has been attained to preclude, at least for the time being, further medication trials, or not. The goal in this very complex disease is to identify a regimen that helps the patient feel significantly better than the pre-treatment baseline the majority of the time, and with sufficient patience, persistence, and a methodical approach (trying as hard as possible to make just one change in the regimen at a time) exercised by both patient and local treating physician, in my experience most MCAS patients have been able to attain the goal.      Other inexpensive agents to be considered include potentially sedating H1 blockers (e.g., hydroxyzine, doxepin, cyproheptadine, clemastine), NSAIDs (note caveats below), quercetin or other flavonoids, alpha lipoic acid, N-acetylcysteine, vitamin C, vitamin D, benzodiazepines, and even amphetamines and low-dose naltrexone; LUDY supplements, too, occasionally provide some help.  More expensive agents include leukotriene inhibitors, ketotifen (this is more expensive only in the [place] it usually is very inexpensive in every other country on the planet), " "cromolyn, pentosan, ivabradine, dronabinol, and hydroxyurea. Like ketotifen, there is another mast-cell-stabilizing/anti-inflammatory agent, too -- tranilast -- readily available in the [place] but only available in the [place] via compounding. Substantially more expensive agents include omalizumab and tyrosine kinase inhibitors such as imatinib. I also have seen somatostatin occasionally prove helpful for refractory non-infectious diarrhea in MCAS.  Although there is not yet any reported use of alpha interferon in MCAS, the systemic mastocytosis literature shows that this drug can help reduce, in some patients, not only tumor load but also mast cell activation symptoms, therefore arguing for potential utility of the drug in MCAS (more comments below). Immunosuppressants such as hydroxychloroquine, azathioprine, cyclosporine, rapamycin, sirolimus, tacrolimus, mycophenolate, cyclophosphamide, etc. tend to help little but occasionally show benefit and thus are not entirely unreasonable to try; dosing is individualized, but it is reasonable to start as is typically done in other situations in which these drugs are used. There are theoretical reasons why newer targeted anti-inflammatories (e.g., infliximab, etanercept, adalimumab, etc.) and Janus kinase (SELENA) inhibitors might be helpful in at least some cases of MCAS, but there have not yet been any reports of ad hoc use of such drugs in MCAS (except for tofacitinib, as detailed below), let alone formal studies. Of note, too, are the (expensive) NK-1 receptor blockers (e.g., aprepitant), which are approved for refractory post-operative or chemotherapy-induced nausea and vomiting but have not yet been case-reported or formally investigated (let alone FDA-approved) in \"mast cell disease,\" yet they have been shown helpful in \"refractory pruritus\" of both malignant and benign/idiopathic etiologies, and one could be forgiven, in my opinion, for suspecting \"refractory pruritus\" " "is likely a manifestation of mast cell activation; it's interesting that binding of substance P ligand to the NK-1 receptor (which is known to be expressed on the mast cell surface) is known to cause explosive mast cell degranulation.  Again, there simply is no way to predict which medications are most likely to help which MCAS patients, and it is the treating physician's best judgment as to the specific sequence of medication trials that will best suit the individual patient. There are no standards established in this matter, and there is no \"right\" or \"wrong\" to trying one medication sooner rather than later.      I typically start a trial of doxepin in an MCAS patient at 6.25-10 mg bid, escalating weekly as tolerated (sedation is usually the limiting toxicity) in 6.25-10 mg bid steps to maximal efficacy or a maximum dose of about 40-50 mg bid. Hydroxyzine can be tried starting at 10-25 mg bid and escalating every 1-2 weeks in steps to  mg bid-tid. Cyproheptadine can be tried starting at 4 mg bid-tid and escalating every 1-2 weeks in steps; maximum dosing typically is 8 mg qid.  Clemastine (which is usually accessible over-the-counter) can be tried starting at 1.34 mg bid, escalating weekly as tolerated in steps of 1.34 mg bid to maximal efficacy or a maximum dose of 2.68 mg bid-tid.      Stimulants such as Adderall (amphetamine-dextroamphetamine, or analogs such as lisdexamfetamine) or Ritalin (methylphenidate) are occasionally helpful. Adderall can be started at 5 mg once or twice daily and escalated in steps to as high as a total daily dose of 60 mg. It probably should not be used in patients with a history of substance abuse. Ephedrine starting at 12.5 mg bid may be helpful; it can be stepped up every week or so as tolerated to as high as 150 mg daily in divided doses. Methylphenidate can be tried at 5 mg bid and escalated in steps every 1-2 weeks as tolerated to maximal efficacy or a maximum dose of 20 " mg bid-tid (optimally 30-60 minutes before meals); if the drug proves helpful, long-acting (and thus potentially more convenient) formulations are available.      Narcotics often are triggers in MCAS. I try to avoid prescribing narcotics for MCAS patients as much as possible. In my experience and as reported in some literature, tramadol, fentanyl, and hydromorphone tend to be better tolerated than other narcotics. I have very limited anecdotal experience, too, that buprenorphine (e.g., the Butrans patch) can be well tolerated and helpful.      NSAIDs can be helpful in some mast cell disease patients but serve as triggers in others. If there is any history of NSAID intolerance, then consideration must be given to having an allergist take the patient through an NSAID desensitization protocol prior to starting a trial of NSAIDs. The NSAID most commonly used in NSAID-tolerant and -responsive MCAS patients is simple aspirin, typically beginning cautiously at 40-80 mg bid and doubling, as tolerated, approximately every 4-14 days to maximal efficacy. I would not push this past 500-650 mg bid, as I have reliably seen intolerable toxicities at total daily doses in excess of 1300 mg/d. Maintenance of 325-650 mg bid dosing requires standing GI prophylaxis in the form of either H2 blocking and/or PPI therapy. I should note, too, that I have seen [facility]AS patients in whom all standard COX1/COX2-blocking NSAIDs are intolerable but who then tolerate COX2-selective celecoxib (typically 100-300 mg bid) quite well and to good effect. That said, celecoxib has a sulfa moiety and traditional teaching had long been that this drug should be avoided in sulfa-allergic patients, though more recent published experience (e.g., http://www.nejm.org/doi/full/10.1056/JCMXqp005307) suggests this is an insignificant consideration and it's OK to try celecoxib in sulfa-allergic patients.      Quercetin, a flavonoid, can be obtained over-the-counter and is  typically started at 250-500 mg bid and escalated in dose or frequency every 1-2 weeks as tolerated in steps of 250-500 mg bid to maximal efficacy or a maximum dose of about 1000 mg tid. If there are hints of a response, a more water-soluble (and thus better absorbed and sometimes more effective) form of this drug, quercetin chalcone, can be tried (typically at about half the dose of quercetin).      Alpha lipoic acid is typically started at 100-300 mg bid and escalated every 1-2 weeks as tolerated to maximal efficacy or a maximum dose of about 300-600 mg bid. In my experience this has tended to benefit sensory neuropathy more than other symptoms, but I also have seen an occasional case in which it provided lafleur global improvement.      N-acetylcysteine is typically started at 300-600 mg bid and escalated every 1-2 weeks as tolerated to maximal efficacy or a maximum dose of about 600-1200 mg bid. In my experience this benefits few patients (most commonly those with presyncopal issues), but sometimes its benefits are global and lafleur.      Vitamin C has been repeatedly shown to inhibit mast cell production and release of histamine. It's typically dosed at 750-1000 mg in a once-daily slow/extended-release form, though I've had one patient respond well at just 500 mg once daily, and some patients report use of such a product bid helps more and appears sustainably tolerable and effective.      Although vitamin D has not yet been reported to have helped any form of human mast cell disease, I will note that a few of my [facility]AS patients who have been prescribed vitamin D supplements by their other physicians to address osteopenia/osteoporosis have reported improvement (soon after beginning the supplement) in symptoms which almost certainly are driven by their MCAS, and a potential direct mechanism for such a response is clear since (normal and malignant) mast cells are known to bear cell-surface vitamin D receptors and  "since engagement of that receptor by vitamin D (calcitriol) clearly results in activation of various intracellular pathways that result in decreased inflammation as shown by a number of measures. As such, and again with the understanding that there are no formal studies of such, vitamin D supplementation in moderation (~1,000 IU per day) would seem to be a not unreasonable, and almost certainly non-toxic (excipient issues notwithstanding) and inexpensive, intervention to try in MCAS, especially in those in whom vitamin D deficiency is identified. Of note, patients who are severely deficient in vitamin D at baseline probably initially need more aggressive supplementation (e.g., ~50,000 IU weekly for 6-8 weeks) before pursuing the above-noted daily supplementation.      I have heard of mast cell disease patients who have improved with low-dose naltrexone but have seen significant improvement in only one patient thus far. Dosing typically is in the range of 1.5-6 mg/d, though some patients may find split dosing bid more helpful.      LUDY supplements are occasionally helpful and are typically dosed in terms of histamine-degrading units (HDUs), e.g., the HistDAO product is dosed as 20,000-40,000 HDUs (1-2 capsules), preferably 15 minutes before meals, especially meals with known higher histamine content.      Speaking of meals, it should be noted that some patients find \"low-histamine diets\" (the specific nature of which seems to vary substantially from one author to the next) helpful, other patients find other diets helpful, and most patients find diets of any sort generally unhelpful.  In general, dietary interventions should be viewed as exercises attending to \"Step 1\" above, i.e., identify triggers as precisely as possible, and then avoid them.  In other words, if a high-histamine-content food such as cheese or wine is found to reliably trigger a flare of symptoms, then that food should be avoided.  All one can do " "with regard to diet is avoid extreme diets and consider each dietary intervention as, well, an intervention akin to a medication intervention: if it has not clearly provided significant benefit after about a month, it should be abandoned and the patient should move on to another intervention/trial.      Also with regard to dietary challenges, more severely afflicted MCAD/MCAS patients sometimes have so much difficulty tolerating a very wide range of foods that \"safe\" oral intake is reduced to an extremely limited dietary range, and sometimes it unfortunately becomes the case that simply no oral dietary intake at all is tolerated. In such patients, it is important to remember that not every dysfunctional mast cell in the body of an MCAS patient is dysfunctional in the same way as every other dysfunctional mast cell in that patient's body, and it often is the case that mast cells in different sites in the patient's body -- even in different segments of a given organ/system -- will be dysfunctional in different ways or degrees. As such, even though the mast cells in the proximal GI tract (mouth, throat, esophagus, perhaps even stomach) may be so widely reactive as to preclude tolerable ingestion of any dietary material, it remains possible that the mast cells in the small and large bowel may still tolerate dietary material and permit absorption of nutrients. In other words, in some MCAS patients who simply can no longer eat, a PEG, PEJ, or PEG-J tube may permit continued enteral feeding, a far better approach to nutrition than parenteral nutrition (which indeed I have seen become necessary in a very few MCAS patients). Although I have seen occasional patients unfortunately react to such tubes themselves (requiring replacement with tubes constructed of alternative materials/plastics), in my experience most MCAS patients who come to require tube feeding adequately tolerate the tube (and the placement procedure), and then " "the question becomes which formula to use. I have seen a wide range of formulas prove successful -- and unsuccessful -- in MCAS patients, and only a patient, trial-and-error process will prove successful in the end (though, again, a very occasional patient proves intolerant of all available/accessible formulas and thus comes to need parenteral nutrition, with the expected much higher complication rate). In my experience, Neocate Jr. and Elecare Jr. have been the most consistently (but, again, not universally) tolerable formulas.      Benzodiazepines -- typically at low doses but given regularly because of the short half-lives of most of the drugs in this class -- also can be helpful, likely because they address the inhibitory mast-cell-surface benzodiazepine receptors. (Of course, they also address similar neuronal receptors, and given the physical proximity of mast cells and neurons in many tissues and the extensive \"cross-talk\" between these two types of cells, it should not be surprising that \"calming the nerves\" can bring about a useful downregulating effect on mast cells independent of whatever direct downregulating effect such a drug might have on mast cells via the mast-cell-surface benzodiazepine receptor.) Lorazepam is a reasonable choice, and even though clonazepam clearly binds to the mast-cell-surface benzodiazepine receptor less well than lorazepam, some patients respond well to clonazepam, too, but these drugs have short half-lives, so any benefit she gains from them will wear off fairly shortly, thus justifying regular dosing if it is going to provide her as much help as possible. (It is for this pharmacokinetic reason that some MCAS patients who benefit from lorazepam or clonazepam clearly do better at tid dosing than bid dosing.) I typically start lorazepam or clonazepam dosing at 0.25 mg bid, escalating every 1-2 weeks as tolerated in 0.25 mg bid increments to maximal efficacy or a maximum dose of " 1-1.5 mg bid-tid. An occasional patient will even do remarkably well at just 0.125 mg bid, so there's nothing wrong with starting at even that cautious a dose. Sometimes diazepam, because of its longer half-life, comes to be identified as the preferred agent of this class in the individual patient. Midazolam is usually an excellent choice for perioperative management.      Steroids of course are inexpensive and often helpful in settling acute flares of mast cell disease, but their long-term toxicities make them unattractive as chronic treatment. Of note, though it's virtually impossible for a steroid to be allergenic (just as with H1 and H2 blockers as discussed above), non-IV steroid injections (as given in painful joints, for example) often contain excipients and/or -ina anesthetics which indeed can be provocative to MCAS patients. The full ingredient list of any steroid injection should be carefully reviewed in advance -- and then re-reviewed if a reaction develops.      The mast cell's  repertoire includes a number of leukotriene moieties, and just as one can't predict which benefits might be seen with other medications tried in this disease, one shouldn't assume that there can't be any benefits beyond the respiratory tract from this traditionally asthma-targeted drug. The leukotriene receptor blocker that's usually tried first is montelukast. In my experience, MCAS patients who respond to montelukast usually respond better to twice-daily dosing (10 mg bid) than once-daily dosing; occasional patients respond even better at 20 mg bid.  I have not seen more benefit with dosing of the leukotriene receptor blockers more frequently than bid. (Some patients clearly do better with brand-name Singulair vs. generic montelukast, or vice versa, likely due more to excipient issues than anything else.) Leukotriene synthesis inhibition can be tried, too, with zileuton, which has a short half-life such that 600 mg  qid dosing generally is recommended over 1200 mg bid dosing. However, an extended-release formulation of zileuton is now available; dosing is 1200 mg bid. Liver function tests should be checked at baseline and then monitored monthly in the first quarter and then quarterly when starting zileuton.      Ketotifen is available very cheaply in every country on the planet except the [place] where it is available in oral form only via compounding, which of course incurs substantial cost. It is commonly started at 1 mg bid, escalating every 1-2 weeks as tolerated in steps of 1 mg bid to maximal efficacy or a maximum dose of 4-6 mg bid-tid. Ketotifen eyedrops can be helpful for the eye irritation frequently seen in MCAS, and this is the sole form of ketotifen that is available in the [place] through standard commercial pharmacies. Demonstration of efficacy of ketotifen in one form is often a sign that the other form will be effective, too. In my experience, different compounding pharmacies have quoted vastly different prices for compounding oral ketotifen; patients are advised to obtain multiple quotes. Although I do have some patients who have chosen to import inexpensive commercial oral ketotifen formulations from foreign pharmacies, given the many concerning reports of poor quality of some medication products obtained through some foreign pharmacies, I do not recommend my [place] patients obtain through a foreign pharmacy any pharmaceutical which can be legally (even if more expensively) obtained through a [place] pharmacy. Especially given the legal protections afforded [place] patients who obtain medications through [place] pharmacies -- protections which may be partly compromised or even non-existent in dealing with foreign pharmacies -- foreign-sourcing of pharmaceuticals is a risk calculation to be made entirely by the patient.      As mentioned above, tranilast is readily available in the [place] but only available in the [place] via  "compounding. Dosing typically is 200 mg tid.      Especially in view of how the same serotonin reuptake elements present on neurons are also present on the surface of the mast cells, selective serotonin reuptake inhibitors (SSRIs) can be helpful in some MCAS patients both through neuronal binding and mast cell binding. Dosing of SSRIs in MCAS is similar to dosing of these drugs for their approved indications. Of note, physicians who prescribe SSRIs in MCAS patients must be alert to development of, and know how to manage, serotonin syndrome, whose presentation can easily be confused for a flare of mast cell activation.      Cromolyn is not absorbed to any significant extent (there is controversy as to whether this holds true in the pulmonary tree) and thus does not circulate systemically to any significant extent but still can be helpful in its OTC nasal spray (Nasalcrom) and eyedrop (Opticrom) formulations as well as when inhaled or swallowed. (A cream for topical cutaneous use can be compounded at home, too, using a bottle of Nasalcrom and various skin creams such as Eucerin; recipes can readily be found on-line.) The oral form is typically started as 100 mg (one ampule) twice daily (preferably, but not necessarily, 30 minutes before meals) and escalated every 1-2 weeks in dose or frequency as tolerated to maximal efficacy or a maximum dose of 200 mg qid, though a few patients have told me that 300-400 mg qid has provided them optimal benefit from the drug and that it's unhelpful for them at lower doses. The nebulized form is typically started at 20 mg bid and escalated every 1-2 weeks as tolerated to maximal efficacy or a maximum dose of 20 mg qid. In a minority of patients, initiation of cromolyn causes an initial mild-moderate flaring of disease that usually can be managed with simple extra dosings of \"rescue medications\" (e.g., H1/H2 blockers, possibly also benzodiazepines and/or steroids). Such flares " "typically settle after 3-7 days, but if flares are severe or appear to be coursing chronically rather than settling, then the drug should be stopped unless it is the generic formulation that was initially dispensed, in which case a trial of brand-name Gastrocrom should also be pursued, as I have seen a number of MCAS patients who find the generic formulation intolerable but who then tolerate, and respond well, to Gastrocrom (in spite of both products bearing identical ingredient lists of just cromolyn and sterile water), obviously implicating the presence in the generic product of an excipient of some sort (whether known to the  or not) which is an offending/triggering excipient in at least some MCAS patients. Importantly, cromolyn is not absorbed, so local and distant benefits seen from successful inhibition of mast cell activation at one site by one form of cromolyn (e.g., oral) may be different from local and distant benefits seen from additional application of cromolyn at a different site (e.g., the sinonasal tract, with nasal cromolyn spray).      Pentosan is typically used to help settle mast cell activation in the  tract (e.g., when the sterile inflammatory symptoms of \"interstitial cystitis\" -- frequency, urgency, dysuria, etc. -- are present). Dosing is 100 mg bid-tid and liver function tests must be monitored (typically monthly in the first quarter and quarterly thereafter).      Long available in [place] until finally gaining approval in the [place] in 6/15, ivabradine is officially approved for heart failure but can be helpful in MCAS, principally for patients particularly bothered by tachycardia. The approved initial dosing of 5 mg bid often is excessive for MCAS patients, and starting instead at just 1.25 mg once daily is probably more appropriate, escalating every 1-2 weeks as tolerated (bradycardia often is the limiting symptom) to maximal efficacy or a maximum dose of 7.5 mg bid.      Much " "as how benzodiazepines can downregulate neurons and mast cells via inhibitory cell-surface benzodiazepine receptors, dronabinol can downregulate neurons and mast cells via inhibitory cell-surface cannabinoid receptors. Dronabinol dosing typically begins at 2.5 mg bid and escalates every 1-2 weeks as tolerated in steps of 2.5 mg bid to maximal efficacy or a maximum dose of around 5-7.5 mg bid-tid. The key compound desired in the cannabinoids, of course, is cannabidiol, not the \"high\"-producing THC. I have heard from occasional MCAS patients who report cannabidiol oil (now legally accessible in certain localities) to be helpful. It is difficult to recommend smoking of cannabis due to the many other toxic compounds in smoke, which in general is a mast cell activator.      Hydroxyurea has been recognized for decades as capable of settling mast cell activation in some patients. The dosing needed to achieve such effect is typically low, starting at 500 mg once daily and escalating after 2-4 weeks as tolerated to maximal efficacy or 1000 mg once daily (though some patients report better effect at 500 mg bid). Patients who react acutely and severely to the \"Hydrea\" 500 mg formulation likely are reacting to excipients, and I have found most such patients (once recovered after having stopped the medicine) subsequently tolerate and respond well to an alternative 200 mg \"Droxia\" formulation, with optimal dosing usually working out to be in the 600-1000 mg total daily dose range. In my experience, hydroxyurea has been particularly useful for treating the diffusely migratory soft-tissue/bone aching/pain commonly seen in MCAS.      Perhaps due directly to the IgG receptors on the surface of the mast cell, and/or perhaps due to other, less direct mechanisms, IV immune globulin (IVIG) helps some MCAS patients (who often first come to IVIG therapy via diagnosis (prior to diagnosis of MCAS, of course) with \"combined variable " "immunodeficiency\" or other, typically poorly defined immunodeficiency syndromes), principally (per my unpublished observations) in reducing fatigue for 1-2 weeks, in accordance with the half-life of human antibodies. Due to the drug's modest and brief benefits and great expense, I am not much of a fan of using IVIG for treating MCAS, and I suspect most patients being treated as such likely can find more medically and financially effective therapy. Still, when most or all other options have been exhausted, it is not an entirely unreasonable option from a biological plausability perspective, though I should note I'm unaware of any published literature (even case reports) actually supporting this particular use of IVIG.  However, I will also note that dysautonomias of various sorts (e.g., postural orthostatic tachycardia syndrome, pseudoseizures, etc.) are common in MCAS, and IVIG has been well established in the literature since at least the '90s as a treatment for dysautonomias, so establishment of a co-morbid diagnosis of a dysautonomia may help achieve insurance coverage for a trial of IVIG in an MCAS patient.      As noted in many case reports now in the peer-reviewed literature, the anti-IgE monoclonal antibody omalizumab appears to benefit some patients with MCAS, utterly independent of the pre-treatment IgE level. Dosing typically begins at 150 mg subcutaneously once every four weeks and escalates in steps as high as 300 mg subcutaneously every two weeks. In counseling patients about the risks of the drug, they should be advised of the 0.1% risk of fatal anaphylaxis mentioned in the product insert, and it is important to follow the 's recommendations to observe the patient in the infusion suite for 2-3 hours after each of the first three injections and then at least 30 minutes after each subsequent injection. In my experience, omalizumab tends to be more helpful for MCAS patients with prolific " "allergies, but its great cost (list price approximately US$30,000/year) needs to be considered when deciding whether to try it ahead of less expensive therapies. Before starting omalizumab, it may be worthwhile checking an anti-IgE IgG level, as an elevated level may indicate the presence of a true autoimmune-mediated mast cell activation for which rituximab (see below) might also be worth trying. Again, if this is going to be checked, it needs to be checked before starting omalizumab since the drug will confound the test and the patient will have to be off the drug at least a year before a reliable native anti-IgE IgG level can be determined again.      Similar cost-benefit calculation is required regarding the tyrosine kinase inhibitors. The prototypical tyrosine kinase inhibitor imatinib (~[place]$120,000/year in the [place] for name-brand \"Gleevec\" from "[employer] or ~US$60,000/year for the generic imatinib from [employer] which became newly available as of 2/16) is believed to target and block/inhibit some of the constitutively activating mutations in KIT suggested by some of the published research to be present in virtually every MCAS patient. The [id number] mutation found so frequently in mastocytosis is resistant to imatinib, but this mutation is virtually never found in MCAS. Imatinib-sensitive mast cell disease tends to be sensitive to low doses of the drug. Imatinib is typically managed by a hematologist/oncologist. A starting dose of 100 mg once daily is appropriate, escalating after one week (if tolerating it OK but unimproved) to 200 mg which typically is given once daily, though I have seen occasional patients report substantially better effect at 100 mg bid or 50 mg tid-qid dosing. In my experience now in seeing this drug control MCAS to a significant degree in several dozen patients, the \"sweet spot\" for dosing tends to be a 200 mg total daily dose (most do fine with once daily dosing), but I do have " "a few patients who have clearly identified that higher doses (300-600 mg total daily dose) definitely serve them better. In my experience, imatinib has tended to provide substantive benefit in MCAS patients manifesting relative or absolute erythrocytosis, perhaps as a consequence of constitutive KIT activation in turn driving JAK2 activation. (Of course, activated KIT will drive activation of the other, inflammation-driving JAKs, too, suggesting a potential role for the typically promiscuous SELENA inhibitors in controlling at least some of the symptoms in mast cell disease. Indeed, I have already seen (but not yet published) excellent responses in mast-cell-driven symptoms from ruxolitinib in myelofibrosis and polycythemia vera patients and from tofacitinib in rheumatoid arthritis patients.) Absolute erythrocytosis, of course, is easy to recognize (hemoglobin, hematocrit, and/or red cell count above the upper limit of normal), but a relative erythrocytosis is more difficult to recognize, manifesting as a \"normal\" H/H in patients with marked multisystem inflammation which one would expect to cause a secondary anemia of chronic inflammation. A chronically inflamed MCAS patient's \"normal\" H/H may be evidence of the abnormal \"pressure\" being exerted on marrow to overproduce red cells, and this finding, set against a history and exam strongly suggestive of significant chronic multisystem inflammation, hints that a trial of imatinib might be appropriate sooner than its great cost might otherwise warrant. In patients with trying economic circumstances, sometimes the 's patient assistance program needs to be engaged in order for the patient to be able to access imatinib. Additional manufacturers ([employer], [employer]) also brought generic imatinib formulations to the [place] market beginning in August 2016 when [employer] lost its exclusive license for marketing generic imatinib, and the price is expected to fall " "dramatically further in the relatively near future.  Patients experiencing difficulty tolerating [employer]- and [employer]-branded imatinib may find these other formulations of imatinib, using different excipients, more tolerable. It remains to be seen whether imatinib will be made available by any  for compounding.      As inferred above, this patient's chronic multisystem inflammation would be expected to cause at least a mild anemia of chronic inflammation, so the \"normal\" H/H in this patient may represent a modest relative polycythemia, thus potentially arguing for a trial of imatinib somewhat earlier in the sequence of therapeutic efforts than its extraordinary cost ordinarily would warrant. (One wonders whether the polycythemia in MCAS comes dominantly from constitutive activation of JAK2 caused by (mutated), constitutively activated KIT (various mutant forms of which imatinib and the other tyrosine kinase inhibitors can address).)      I also have seen low-dose imatinib (again, most commonly 200 mg/d) quickly bring complete resolution to the lipodystrophy (along with great improvement in many other symptoms) in a number of patients who were diagnosed with Dercum's disease before later being discovered to have MCAS as their unifying diagnosis. I also have seen low-dose imatinib quickly bring complete resolution to very severe, otherwise refractory hypertriglyceridemia, similarly suggesting there is some pathway by which MCAS can generate such a dyslipidemia.      Some patients who do not respond to imatinib go on to respond well to other TKIs such as dasatinib or nilotinib. As with imatinib, usually only low doses (e.g., 20-40 mg of dasatinib daily) are needed in such patients. Although there are a very few cases reported in the literature of nilotinib helping to control mast cell disease, I myself have seen no successes with this drug in the few patients in whom I have tried it; in fact, I have seen acute " intolerance in about half the patients in whom I have tried it, again suggesting an issue with reactivity to the medication product's excipients rather than nilotinib itself. I also am aware of a few cases (one recently published in the  Journal of Haematology) in which low-dose (12.5 mg/d) sunitinib was given for very severe MCAS and rapidly achieved complete response (1 case) or very good partial response (a few additional cases) without any apparent toxicity (now sustained about two years in the published patient with a complete response). (There also is a published pre-clinical report in a rat model providing rationale for trying sunitinib in mast cell disease.)      In general, I have observed low-dose dasatinib to be more effective for CNS-related symptoms (e.g., profound, ultra-refractory depression in one patient) and low-dose sunitinib to be more effective for patients whose MCAS phenotype features a strong allergic/anaphylactoid component. Although dasatinib has a propensity at CML-level dosing (~100 mg/d) to drive effusions, I have never seen such problems develop at the 20-40 mg/d dosing level. Still, in a patient with significant pre-existing pulmonary disease, the potential for pulmonary complications would have to be watched especially carefully if dasatinib were to be started.      As activated KIT drives activation of the JAKs, and since JAK1 and JAK3 drive inflammatory  production, inhibition of JAK1/JAK3 may be a route, too, toward at least some symptomatic improvement in MCAS, and there now is a case report (Eur J Haematol 2017) of successful use of tofacitinib (in fact, even more success with extended-release product 11 mg qd than immediate-release product 5 mg bid) in two patients, including an intriguing excellent response in one of these patients of her marked post-prandial abdominal bloating, a symptom which may represent an acute bowel musculature dysautonomia (analogous to  the vascular dysautonomia of postural orthostatic tachycardia syndrome or the neurologic dysautonomia of pseudoseizures not uncommonly seen in MCAS) and which often unfortunately proves particularly refractory to other treatments.     I have occasionally seen somatostatin prove helpful for refractory non-infectious diarrhea in MCAS.  Usually the long-acting form of the drug (Sandostatin LAR) is used, with dosing typically in the 10-30 mg range (taken subcutaneously every four weeks).      I have heard from an occasional colleague (though not seen reported yet) that ursodiol (standard dosing 300 mg bid) can be helpful for GI symptoms in an occasional MCAS patient, though to date I myself have seen such improvement in only a single patient.      As inferred by its very name, MCAS is a disease principally of inappropriate mast cell activation, not inappropriate mast cell proliferation. As such, it would seem there is little call in the management of MCAS for the antiproliferative treatments used to treat mastocytosis such as cladribine and interferon. Both drugs have substantial toxicities which also would question their utility in MCAS, principally cytopenias and immunosuppression with cladribine and a flu-like syndrome with interferon which in my experience often don't tachyphylax as significantly or briskly as the manufacturers typically promote. Alpha interferon also bears risks for significant cytopenias, hypothyroidism, and depression. Nevertheless, downregulation of mast cell activation has been seen with alpha interferon.  Thus, one wonders whether a cautious trial of the drug might be worthwhile at some point in otherwise refractory MCAS patients, particularly those whose medication excipient intolerance issues preclude trials of most oral medications. I am unaware of any peer-reviewed literature reporting the use of cytotoxic chemotherapy such as cladribine, or any type of interferon, in the treatment of  "MCAS. There are preclinical data suggesting CD30-targeting brentuximab may be helpful for cytoreduction and  release downregulation in mastocytosis, but there has been no clinical testing yet in that disease, let alone in MCAS.      Urgent/emergent management of flares of mast cell disease generally include extra dosings (IV if possible) of H1 blockers (e.g., diphenhydramine  mg) and H2 blockers (e.g., famotidine 20-60 mg), possibly also with glucocorticoids (e.g., methylprednisolone 1-2 mg/kg) and possibly also with benzodiazepines (e.g., lorazepam 1-3 mg). Patients are advised to try different formulations of rapid-acting antihistamine products (e.g., liquid formulations, or rapid-dissolving formulations such as Claritin Reditab or Zyrtec Fast-Melt) to identify what is tolerable and works well for them and then to regularly carry such products with them if they frequently suffer flares. Of course, epinephrine is always the \"go-to\" drug for anaphylaxis unless the patient is on a beta-blocker, in which glucagon is the \"work-around\" drug. If a mast cell patient has suffered repeated helga anaphylaxis or severely anaphylactoid states, particularly if the triggers are unclear, it is recommended that the patient always have immediate access to two doses of epinephrine (or glucagon, as just noted), usually via an Epi-Pen or equivalent device. Although The Mastocytosis Society and some experts recommend that *all* patients with (any form of) mast cell disease always carry epinephrine autoinjectors, it is unclear to me -- especially as we are increasingly learning how large and diverse the MCAS population truly is -- whether such a recommendation should indeed be made so globally. Though anaphylaxis obviously can be quickly fatal, and though anaphylaxis is always a risk at some level in (diagnosed and undiagnosed) mast cell disease patients, past behavior of MCAS largely predicts future behavior (at least " until a new major stressor occurs), so in my opinion it is acceptable for patients who have never anaphylaxed (or perhaps anaphylaxed only a long time ago to a specific agent which they have since avoided and are likely to continue to be successful in avoiding) to weigh their small (but non-zero) risk for anaphylaxis against the costs, in all the meanings of that word, of carrying epinephrine autoinjectors on their persons for the rest of their lives. Some patients feel Epi-Pen Twinjectors or other autoinjector devices are more convenient than standard traditional Epi-Pens; efforts should be made to accommodate the preferences of the patient who will need to carry the device(s) with him/her for the rest of his/her life. The indications for usage of an epinephrine autoinjector is when the patient can't breathe or can't swallow, and it's important that patients be instructed in the proper use of such a device (in general: call for help; lie down flat; inject in one lateral thigh (through clothing is OK) (unless the instructions with her specific device direct an alternative injection approach), then inject again in the contralateral thigh if unimproved after five minutes and help hasn't yet arrived). For obvious reasons, it's very important for patients to read the instructions for their epinephrine autoinjectors as soon as they receive them, and preferably for them to even have a chance to simulate use with a dummy device.      Another drug primarily used for emergent management of MCAS -- particularly in patients with refractory angioedema -- is icatibant (30 mg, injected subcutaneously in the abdomen). The drug can be extremely effective very quickly, but its extreme expense (approximately US$7,000 per dose) perhaps calls for at least a brief trial of routine emergent management (e.g., antihistamines, steroids, benzodiazepines) before resorting to this option.  One must also be cognizant of the fact that icatibant  "is in the class of peptidergic drugs, and data have begun emerging in the literature suggesting that this class of agents may pose as triggers in some MCAD/MCAS patients.      I'll also note that there have now been a very few case reports in the literature of rituximab working very well to control (at least for several months) otherwise refractory \"idiopathic anaphylaxis\" (IA), which in my opinion almost certainly is a manifestation of MCAS. There also are small studies showing efficacy of immunosuppressive approaches such as with rituximab or cyclophosphamide or cyclosporine in treatment of \"chronic (auto)immune urticaria\" (associated with anti-IgE and/or anti-IgE-receptor autoantibodies).  It is likely that IA and CIU are manifestations of MCAD/MCAS, and thus, whether one applies one of these diagnostic labels or another to such patients, I feel rituximab (or other immunosuppressive approaches as briefly suggested above) are reasonable, but patients and their treating physicians need to be cognizant that rituximab is not curative and is expensive and that it takes roughly one year to reconstitute B-cell immunity after rituximab treatment. I'll also note that it's the treatment successes, not failures, that tend to get reported in the case literature, and I have heard from some physicians that their trials of rituximab for a few of their IA/MCAS patients have shown no benefit at all.  I, too, have seen more failures of immunosuppressant treatment of MCAD/MCAS than successes, but the occasional success of course is gratifying when many other treatments have failed.      Although her anti-IgE and anti-IgE-receptor antibody levels are normal, I'll make general note here that autoimmunity is frequently \"spun off\" from/by mast cell disease, and in my experience most such autoantibodies are pseudoantibodies or \"mimicking\" antibodies, i.e., specific enough to react with the corresponding probe but insufficiently specific " "to actually cause the disease with which the antibody is associated.)  There simply is no testing available that can distinguish clinically active mast-cell-targeting autoantibodies from non-pathogenic pseudo-mast-cell-targeting autoantibodies.  I would be cautious about diagnosing (let alone treating) any antibody-associated disease (autoimmune or infectious) based solely on the detected presence of an antibody unless the classical clinical presentation of the disease is also present.    Perioperative management is similar to emergent management. The surgeon and anesthesiologist should be aware of the patient's mast cell disease and should read any of the many treatises in the literature on the subject (a link to one such article is provided below). Perioperative guidance for professionals is also available on the website of The Mastocytosis Society at http://www.tmsforacure.org.      A medical alert bracelet is often helpful to emergency personnel. As most such personnel are not presently trained regarding mast cell disease, the first word on the bracelet should be \"anaphylaxis.\"      Although it's likely that most or all of an MCAS patient's various problems are due directly or indirectly to the MCAS, I'll also note the imperative that MCAS patients not assume that major exacerbations of prior problems, or emergence of new problems, are necessarily due to their mast cell disease. Serious illness should always first undergo standard evaluation as appropriate for the particulars of that illness, and only if no other etiology is identified, and if the problem is one that can reasonably be attributed to mast cell disease, is it then appropriate to attribute the problem, by default, to the mast cell disease. Even if an identified problem is indeed attributable (directly or indirectly) to mast cell disease, any standard/classic therapy that exists for the problem should be applied (concurrently with MCAS-directed " "therapy). For example, mast cell disease can cause myocardial infarction via multiple mechanisms, but standard myocardial infarction therapy is certainly the priority over mast-cell-directed therapy for any myocardial infarction being driven by mast cell disease. The tendency of an MCAS patient to blame all ills directly on the mast cell disease, and the desire in such patients to focus solely on MCAS-directed therapy, may be understandable, and MCAS patients typically have so often been disserved in urgent and emergency care facilities that their desire to avoid them may also be understandable, but staying home and trying to initially treat acute severe chest pain with Benadryl is obviously a Very Bad Idea that could easily and quickly prove fatal.      It's so important that I will say it again: there is no method at present to provide better guidance as to which medications should be tried sooner vs. later, there is no \"right\" or \"wrong\" decision as to which intervention to try next, and there simply is no substitute for patience, persistence, and a methodical approach -- on the parts of both the patient and the physician -- in the journey toward identifying optimal therapy for the individual patient. Some patients are so haider as to identify substantially helpful therapy within the first few months of beginning the journey; other patients require years, and trials of more than a dozen medications, before a truly beneficial one is found. Enrollment in clinical trials should be considered when such trials become available.      Mast cell disease often drives bone changes (far more often osteopenia or osteoporosis than osteosclerosis, but sometimes both osteopenia/osteoporosis and osteosclerosis at different sites in the same patient at the same time). It is reasonable to check baseline bone densitometry upon diagnosis of a mast cell disorder if not already recently done. If the patient is found to be osteopenic or " "osteoporotic, routine vitamin D and calcium treatment should be tried first, but if follow-up bone densitometry proves such basic treatment to be unhelpful, then bisphosphonate or anti-RANKL therapy is warranted. (I'll note I've often seen MCAS patients prove intolerant of bisphosphonates, but I've never seen bisphosphonate-intolerant MCAS patients prove intolerant of (or unresponsive to) denosumab.) Of course, the single best approach to managing bone disease consequent to MCAS is to control the MCAS as best as possible. I should also note that use of bisphosphonates or denosumab requires pre-treatment clearance by a dentist due to the possibility of these drugs causing (potentially quite morbid, even fatal) osteonecrosis of the jaw (ONJ) in patients with poor dentition or recent dental work.      A priori, the odds of eventually achieving the typical (palliative) MCAS management goal (of finding a regimen that will help the patient feel significantly better than the pre-treatment baseline the majority of the time) in any given MCAS patient are as good as in any other patient (that is to say, pretty good), but only time will tell the ultimate outcome for the individual patient.      In case the following might be helpful, here are a few references to peer-reviewed literature and other educational material about MCAS (authorial bias acknowledged):      1. A short review that provides the \"Molderings 2011\" diagnostic criteria: http://www.jhoonline.org/content/4/1/10. Also, the Valent 2012 criteria are available at http://epub..[facility].de/96600/[email or web address].pdf, but I'll note I strongly favor the Molderings 2011 criteria over the Valent 2012 criteria since in my experience the latter fit the observed and reported biology of the disease less well than the former.      2. An updated short review from 2014: http://www.allergysa.org/Content/Journals/September2014/ThePresentation.pdf.      3. An approach to " diagnosis from 2014: http://www.[employer]abe [employer]a/5316-8181/pdf/v3/i1/1.pdf.      4. A comprehensive review chapter from 2013: https://www.Wisecam.Motivapps/catalog/product_info.php?products_id=82936.      5. A transcribed grand rounds (including slides) from 2011 ([facility]): http://mastocytosis.ca/axhrepvpnz0621.html.      6. A video of a grand rounds from 2014 ([facility]): see the August 6, 2014 entry under Allergy Webinars at http://www.paediatrics.t.ac.za/scah/clinicalservices/medical/allergy.      7. There are many reviews in the peer-reviewed medical literature about perioperative management of mast cell disease (all focused on mastocytosis, but the principles are the same for MCAS). At present, the most recent is Dewachter et al., Anesthesiology 2014, DOI 10.1097/ALN.[id number] (this may be freely available to some at http://anesthesiology.pubs.asahq.org/article.aspx?lvzqnmxjg=9143837). A shorter discussion of perioperative management is available from The Mastocytosis Society at http://www.tmsforacure.org/documents/ChroniclesSE.pdf.      8. I should note, too, that given the fundamental precept that mast cells are present, and contribute to regulation of function, in *all* systems/organs/tissues, it is most certainly the case that MCAS can cause a wide range of neuropsychiatric symptoms and disorders, too. Prior to diagnosis, most MCAS patients are thought by at least some of their providers, family, and acquaintances to be psychosomatic. Along with a number of co-authors, I recently published a review of the neuropsychiatric aspects of MCAD. It's not freely available, but the access point is http://www.sciencedirect.com/science/article/pii/T8808936890109907. I'm happy to provide a copy of this for private use upon request.      9. My early experience with continuous diphenhydramine infusion is available as an American Society of Hematology 2015 abstract at  "http://www.bloodjournal.org/content/126/23/5194.      10. A comprehensive review of pharmacologic therapy of systemic mast cell activation disease has been published recently (Shantel JIN et al., Pharmacological treatment options for mast cell activation disease, [facility] Pharmacol 2016 Apr 30, DOI: 10.1007/x43220-285-7459-7).      11. A more comprehensive characterization of MCAS has been published recently (Michaela LB et al., Characterization of mast cell activation syndrome, Am J Med Sci 2017 Mar;353(3):207-215, DOI: 10.1016/j.amjms.2016.12.013).    In summary, I think there are many therapeutic directions that could be legitimately pursued in this patient, and there simply are no data at present to say, or even suggest, that any one particular sequencing of medication trials is \"more right\" or \"more wrong\" than any other sequencing. Determination of a particular sequence to be pursued in this patient will depend on the treating provider's best clinical sense of the matter as influenced by present and emerging symptoms and findings, patient desires, and treatment accessibility (i.e., vis-a-vis third-party payer coverage). Again, there is no \"right\" or \"wrong\" decision here regarding which medication to try next. I can only emphasize the importance of having the patience to make just one change in the regimen at a time whenever possible, and in truth the only \"wrong\" decision that can be made here is to stop trying altogether (unless, of course, the patient decides, for whatever reason, she just does not want to pursue any more medication trials).      All of the above having been said, I think that given the particulars of her presentation, it might be best to focus aggressively at first on identifying an optimal full (H1 + H2) histamine receptor blocker regimen for her.  Given her inflammatory issues and the elevated prostaglandin moieties, trials of NSAIDs (e.g., aspirin first, then celecoxib " "if intolerant of or unresponsive to aspirin) would be reasonable. For GI tract issues, medications beyond antihistamines that might be helpful include (but certainly are not limited to) oral cromolyn, montelukast, compounded oral ketotifen, and low-dose benzodiazepines.  If oral cromolyn is helpful, other forms of cromolyn (e.g., nasal, ophthalmic, nebulized) might also help.  Pentosan might be useful to try if her symptoms consistent with interstitial cystitis are sufficiently bothersome.  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  Clearly, there are many options, and it will be important for her to try just one at a time as much as possible, and it also will be best for her to work principally with just one of her physicians in stepping through trials of various medications for this disease, though other local consultants certainly can be engaged as necessary for trials of select medications with which her lead physician may be unfamiliar or otherwise uncomfortable in personally prescribing (e.g., imatinib).      The previously established follow-up plan remains sufficient. The patient understands that out of professional courtesy I will not \"cold-call\" or \"cold-email\" any health care provider about her. However, I will be happy to respond to any provider's *direct* request to me for additional input in her case.  Until my impending departure from [facility] in late July (note I will provide my patients my future contact information before that point), I can best be contacted either via e-mail at [email or web address], or a phone call can be scheduled via office staff here at [phone], or I can be paged via our " "[facility]'s paging  at [phone] if emergency consultation is felt necessary. Also, my clinic nurse coordinator, Kayleen Carringtno RN, can be contacted at [phone]; patients wishing to contact her electronically should preferentially use the \"[facility]\" portion of the electronic medical record system here. Of note, at present I do not provide phone-based (or video-based) follow-up visits to patients, and the phone numbers I've provided here are intended for use by the patient's physicians and other providers. A patient who calls me to report new problems (or severe exacerbations of old problems) and request guidance (or request provision of guidance to her local provider) should expect me to redirect her to her local providers to first be properly assessed in standard fashion for the particular problems and complaints being experienced at that time, and if, after such evaluation has been performed, the provider desires to discuss the situation further with me, I will be happy to engage in that dialogue if, again, the provider directly requests my input. As stated above, mast cell disease does not render one immune to other diseases, and the patient and all of her providers must remain vigilant to the possible emergence of other illnesses (whether ultimately attributable to her mast cell disease or not) for which standard (non-mast-cell-directed) treatments have been defined.      I spent another two hours writing this addendum, but as this was not face-to-face time with the patient, no additional billing was submitted.      Typed, reviewed, and electronically signed by: Spencer Jennings M.D.     DT: 06/26/2017 12:11 AM    cc: 1. Lucia Christianson M.D. ([address] [address], [phone], fax [phone])  2. Please also mail a copy to the patient at her home address as listed in the system.    Spencer MONROE " MD Michaela

## 2017-01-25 NOTE — Clinical Note
"2017       RE: Laverne Irvin  [address] [facility] [address]     Dear Colleague,    Thank you for referring your patient, Laverne Irvin, to the [facility]. Please see a copy of my visit note below.    Patient: Laverne Irvin (MRN [id number])   Encounter Date: 2017  Referring: Lucia Christianson M.D. ([address] [address], [phone], fax [phone])  Attending: Spencer Jennings M.D.     Chief Complaint/Reason for Referral: Second opinion regarding possible mast cell activation disease (MCAD).    History of Present Illness: This 56 year old  white A1 medically disabled (on SSDI since 7/15)  is kindly referred in consultation regarding the above-noted issue.  At the beginning of the encounter, I reviewed all available chart data, consisting principally of about 30 notes and a handful of test results in her electronic chart here dating back to , the salient points of which I've incorporated into the narrative below.  The history here is a bit complex, and it's probably best to just present it all chronologically, blending HPI and PMH as follows.  I should note here at the outset, too, that the patient obviously was afflicted by a good bit of cognitive dysfunction and was having a fair bit of trouble with her remote and recent memory.  She kept apologizing for having such difficulty recalling and recounting her history.  She provided a rather \"scattershot\" history, and I've done my best here to reconstruct it into chronological order, but it's possible I might be missing some significant events or have some events out of order.  Her history of chronic multisystem polymorbidity of general themes of inflammation, allergy, and aberrant growth and development in various tissues actually date back to her earliest memories, recalling that she has " "never been able to sweat and thus can't bask in the sun lest she quickly get \"overheated.\"  She also recalls she has \"always\" (again, dating to her earlier memories) suffered an array of allergies, requiring special soaps and being intolerant of make-up and jewelry and a wide assortment of fragrances and chemical odors.  She doesn't recall any other health issues from childhood but knows that migraines started at 13, and when menarche came at 14, she was immediately beset by \"horrible\" dysmenorrhea and menorrhagia, not uncommonly even causing incidents of helga syncope, all of which continued until her first pregnancy.  She also recalls other problems beginning in early to mid adolescence including an unusual extent of exercise-induced fatigue, many episodes of bronchitis (since about 15) and sinusitis (since about 16).  She says she was \"constantly\" tachycardic since some point in adolescence, and an assortment of medications tried for this never helped much.  She also recalls she has been constipated \"forever,\" though in more recent years this has alternated with diarrhea.  She also recalls suffering gastroesophageal reflux disease for many years and simply cannot remember how far back this began.  She knows she suffered an episode of \"toxic shock syndrome\" in high school.  Her first pregnancy came at 27, notable only for prolonged labor and her  daughter having a heart condition of some sort.  Her second pregnancy came at 32 and went smoothly.  Her third pregnancy came at 33 and was miscarried early; no cause was sought or identified.  Her fourth (and final) pregnancy came at 36 and resulted in placenta previa requiring bedrest for many weeks with delivery by emergency  at 30 weeks, and then the  wound dehisced and had to heal by secondary intention.  Soon after this last pregnancy she began developing neck pain, and at 39 a Chiari malformation was diagnosed as the cause of this.  She " "underwent decompression surgery which did help initially -- she says it was a \"lifesaver,\" as previously she had been \"crippled\" by the pain -- but over time symptoms have slowly relapsed.  Bladder surgery of some sort (possibly a sling?) came around age 41, followed by total abdominal hysterectomy and bilateral salpingo-oophorectomy at 47 and resection of a large thigh lipoma a month later.  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  She was seen for this by Dr. Lucia Christianson, who did diagnose EDS3; genetic testing for other forms of EDS and other connective tissue disorders was all negative.  Dr. Christianson also came to suspect the patient might have a mast cell activation syndrome, leading to the present evaluation.  Unfortunately, she did not receive the letter we usually send to patients ahead of their appointments advising cessation of NSAIDs/salicylates and PPIs if possible, and the patient actually has continued taking her regular NSAID and PPI medications right through to this morning.  She says her chief complaint is diffusely migratory pain.    On a full review of systems, although she denies any issues with flushing, epistaxis, easy bleeding, irritation of the nose or mouth or throat, or adenopathy, she endorses a wide range of other, chronic/recurrent, episodic/intermittent and/or waxing/waning issues including subjective (rarely objective) fevers, feeling cold much of the time, constant marked fatigue (often to the point of exhaustion), malaise, headaches, diffusely migratory aching/pain, diffusely migratory pruritus, lifelong anhidrosis, unprovoked fluctuations in weight and appetite, irritation of the eyes, frequent acute brief inability to focus vision, slight hearing loss, tinnitus, easy bruising, \"constant\" sinonasal congestion, occasional coryza, occasional " "post-nasal drip, occasional proximal dysphagia, dyspnea, occasional palpitations, non-anginal central chest discomfort/pain, gastroesophageal reflux, occasional nausea, rare vomiting, diarrhea alternating with constipation, diffusely migratory abdominal discomfort including (usually post-prandial) bloating, urinary frequency and retention, diffusely migratory (but principally just left-sided) weakness, edema largely limited to the left face, diffusely migratory tingling/numbness (typically about the distal extremities), orthostatic and non-orthostatic presyncope, syncope in past as noted above, cognitive dysfunction (particularly memory, concentration, and word-finding), hair loss, deterioration of dentition despite good attention to dental hygiene, brittle nails, diffusely migratory rashes (typically patchy macular erythema), hives (principally just in reaction to certain medications), insomnia, early waking, anxiety, depression, diffuse joint hypermobility, and poor healing in general.  Complete ROS o/w neg.    Family history is notable for a father with chronic obstructive pulmonary disease (he had smoked a long time), atrial fibrillation, congestive heart failure, \"non-stop\" itching (especially about his back, where there also are \"lots of moles\"), easy bruising, \"lots of hernias\" including a hiatal hernia, S/P CABG, bladder cancer, and gastroesophageal reflux disease, a mother with chronic bronchitis and sinusitis, episodes of pneumonia, S/P myocardial infarction, episodes in which her \"throat closes,\" vocal cord polyps (she, too, is a smoker), three strokes, diffuse joint hypermobility, anxiety, tremor, and poor healing, two daughters with Chiari malformation which has been surgically decompressed, a ventricular septal defect in her oldest daughter which had spontaneously closed by age 2, EDS3 and dysmenorrhea and menorraghia in all three of her daughters, spells of anxiety, tremor, dyspnea, and palpitations " "in her youngest child who also suffers migraines, \"eye convergence issues,\" \"lots of dental problems,\" and who has had some concussions, skeletal injuries and \"never-ending ankle issues\" in her middle daughter, and a brother with HIV. The patient has smoked up to 2 packs of cigarettes a day from 18 to the present, but she denies any history of significant alcohol use (in fact, just a slight bit causes nausea and presyncope) or illegal substance use except for some experimentation in college plus frequent marijuana use which, most interestingly, \"helepd me get through high school.\"  She notes she has been referred for a trial of medical marijuana.    She lists her current medications as Flonase prn, Senokot-S prn, fluoxetine 60 mg qd, ibuprofen 800 mg bid, omeprazole 20 mg qd, tizanidine 2 mg qhs, acyclovir 400 mg prn, cetirizine 10 mg prn allergies, vitamin D 1000 units qd, Sudafed 60 mg bid, and Percocet prn.  She notes Dr. Christianson previously had empirically tried her on Zyrtec and Zantac bid, but this did not appear to help any of her symptoms, and she stopped taking them regularly quite some time ago.  She lists her current allergies as severe depression to Chantix, hives to penicillins, itching to codeine, fatigue to Cymbalta, gabapentin, and Lyrica, itching to morphine, nightmares to topiramate, emotional lability to tramadol, and rash to erythromycin.    Exam finds a mildly overweight woman appearing her stated age and in no acute distress, obvious smoker's voice but no odor of smoke about her at the moment, pleasant, cooperative, fully alert and oriented, independently (if just a bit slowly/achily) ambulatory, presenting by herself.  Vitals per chart, notable solely for weight 178 pounds.  Key findings are her comfortable general appearance, HEENT benign but for fair dentition (and, as I often see in EDS3, she can open her mouth to an unusually wide degree), no " plethora/pallor/diaphoresis/jaundice/rash/bleeding/bruising, neck supple, no JVD or thyromegaly or carotid bruits, no palpable adenopathy or tenderness at any of the usual node-bearing sites, clear lungs, regular heart (though with curiously soft heart sounds) with no adventitious sounds, abdomen a bit overweight but otherwise benign, a bit of tenderness on palpation over the mid-thoracic spine, no peripheral edema, neuro grossly intact but for her report of chronic tingling/numbness in the distal left extremities.  On a light scratch test on the upper back, a remarkably minimal degree (compared to what I usually see in MCAD/MCAS) of dermatographism (erythroderma only, no hives) emerged but was fully sustained when last checked 10 minutes later.      Review of available lab data was notable for routine blood counts and chemistries (except for mild hypoproteinemia and hypoalbuminemia) and urinalysis in '15.  Interestingly, the pathology on the hysterectomy noted inflammation at the cervix.  The patient says she had a colonoscopy about five years ago at some facility in the [facility] and knows that some sort of mild abnormality was found, but she doesn't know whether any biopsies were obtained.    A/P: Kudos to Dr. Christianson, because I think she's likely right in her suspicion that a mast cell activation disorder (MCAD) -- and far more likely the far more prevalent (if only recently recognized) type termed mast cell activation syndrome (MCAS) than the rare (but long recognized) type termed mastocytosis -- is at the root of most or all of the patient's chronic multisystem polymorbidity of general themes of inflammation, allergy, and aberrant growth and development in various tissues (though obviously the COPD she has to have to some degree at this point is mostly or all due to her smoking, and it's imperative she stop smoking if for no other reason than that it's a potent mast cell activator). Beyond all the other  diseases already ruled out by the great extent of testing she's undergone to date, I don't know of any other human disease that comes anywhere near as close as MCAS does to accounting, directly or indirectly, for the full range and chronicity of all the symptoms and findings here. (Of note, too, I'm not saying that any of her prior diagnoses are wrong. It's just that each of them accounts for only one subset or another of all that's gone on in her, while MCAS can account for most or all of everything that's been going on in her.) All of that said, she needs objective laboratory evidence of improperly behaving mast cells, toward which end I ordered the range of testing I typically check in assessing for MCAS, namely, a CBCD, CMP, magnesium, chilled serum tryptase and chromogranin A, chilled plasma prostaglandin D2 and histamine and heparin, chilled random urinary prostaglandin D2 and N-methylhistamine and leukotriene E4 and 2,3-dinor 11-beta-prostaglandin-F2-alpha, and chilled 24-hour urinary prostaglandin D2 and N-methylhistamine and leukotriene E4 and 2,3-dinor 11-beta-prostaglandin-F2-alpha. I also ordered an anti-IgE IgG and an anti-IgE receptor antibody, which won't be useful diagnostically but may influence certain therapeutic decisions down the road if MCAS is proven. However, since she has been using her ibuprofen and omeprazole up to this morning, I asked her to stop these drugs (and any other NSAIDs, salicylates, and PPIs) if possible and return in a week to provide blood and urine samples.  We provided her careful written and verbal instructions regarding the 24-hour urine collection including the importance of continuous chilling of the specimen throughout collection and transport.  She understands the various reasons why a single round of  testing might yield all negative results, and she understands that if this happens, it of course doesn't even begin to invalidate/refute/negate even a single  "element of her history (which strongly argues for a mast cell disorder). Her history is what it is, so if we get a negative round of testing, I'll simply recommend repeat testing (which she'll of course need to pursue locally), albeit with specimen collection at that point preferably deferred to a particularly symptomatic day. I also asked her to work with our clinic nurse coordinator, Kayleen Carrington RN, to try to arrange for her old colonic biopsies (if any) to be reassessed (either locally or here) with  staining (and we'll do the same here for the resected cervical tissue which showed inflammation), as mast cells can't be seen with routine H&E staining and I've often seen \"normal\" or \"mildly chronically inflamed\" GI tract biopsies in MCAS patients \"light up\" on  staining revealing mast cell disease. If 2-3 rounds of blood and urine testing yields all negative results, and if restaining of any available old biopsies either can't be done for some reason or yields negative results, the \"backup plan\" will be to pursue a fresh set of bidirectional endoscopies to obtain biopsies throughout the luminal GI tract for pathologic evaluation specifically (using  staining at a minimum) for increased (>20/hpf) numbers of mast cells (as often seen mildly-moderately in MCAS, though not to anywhere near the degree (or patterns) seen in mastocytosis).    Although I cautioned her that she doesn't have a definitive diagnosis of MCAS yet, we did engage in extended discussion today about general aspects of MCAS including its essential nature of chronic multisystem polymorbidity of a generally inflammatory theme, the availability of many therapies shown helpful in various MCAD/MCAS patients, the good likelihood of (eventually) finding therapy that will help her achieve the goal of feeling significantly better than the pre-treatment baseline the majority of the time, and the present frustrating absence of any " "biomarkers that can help predict which of the many available, potentially helpful therapies is most likely to benefit the individual patient. She understands that if we are able to secure a diagnosis of MCAS, she will be dependent on pursuing -- in patient, persistent, and methodical fashion, trying as hard as possible to make just one change at a time -- trials of the various therapies (which, again, lacking predictive biomarkers, we generally pursue in order of escalating cost). She understands that once all test results are available about a month from now, I will write an addendum to this note (to again be distributed to all of her physicians listed below) providing my interpretation of the results and recommendations for next steps.    Given that she did not appear to be in any particular distress and seemed to be somewhat nonchalant regarding her chronic pain (probably because of just how chronic a problem it is and the fact that her ibuprofen and other analgesics currently keep it at a manageable/tolerable level), and given that she appears to have already failed a trial of at least one combination of H1 and H2 antihistamines bid, I did not recommend any other empiric therapy for her today, though if we do confirm the diagnosis, the first order of therapeutic business here will be to have her try the other non-sedating H1 blockers as well as at least one other H2 blocker.  Some patients find that alternative H1 or H2 blockers serve them better, some patients find that tid dosing serves them better than bid dosing, and some patients find that slightly higher dosages (e.g., 20-30 mg rather than 10 mg of cetirizine, or 150 mg rather than 75 mg of ranitidine) serve them better than lower dosages.  Eventually, she will need to \"experiment,\" taking 2-4 weeks with each specific combination of drug/dose/frequency to understand what it can accomplish for her in controlling this disease of naturally waxing/waning " "intensity. If we find she has MCAS, we will also need to caution her that MCAD/MCAS patients have quite a predilection to adversely react not necessarily to the active ingredients in their medications but rather to the excipients (fillers, binders, dyes, preservatives, etc.) in their medications. As such, if she experiences an adverse reaction very shortly after beginning an ordinarily well tolerated medication (as is certainly the case with the H1 and H2 blockers), excipient reactivity must be suspected and she should promptly work with her pharmacist to review the medication's ingredient list, try to identify the likely offending ingredient, and try one or more alternative formulations of the medication which don't share any of the offending formulation's excipients.    It will be difficult (given that my schedule is now 100% booked for the next year) for me to see her back once the test results are back in a month or so, let alone to manage her through the \"tactical maneuvers\" of trying various medications to gain better control over the disease.  I am aware that Dr. Christianson has been acquiring a good degree of experience and facility with managing MCAS patients through trials of the various therapies known to help them, so we will plan on having the patient principally follow with Dr. Christianson for these \"tactical\" medication trials once a definitive diagnosis has been established.  The patient will next return here in about 2-3 months to discuss her test results and general notions of the disease and the approach to treatment, but I noted to her that since I expect we'll have test results in about 4-5 weeks and I'll send my addendum back to Dr. Christianson at that time, it's quite possible she may already have started treatment with Dr. Christianson by the time she next sees me.  I anticipate that after her next visit here, I'll be seeing the patient roughly annually for \"strategic\" reassessments.    As is usually the case with " initial consultations for mast cell disease, this was a long encounter, 100 minutes in total, with 60 minutes spent in counseling. The patient indicated that all of her questions at this time had been answered to her satisfaction, and she expressed appreciation for the time I had given her.      Typed, reviewed, and electronically signed by: Spencer Jennings M.D.     DT: 01/25/2017 09:08 PM    cc: Lucia Christianson M.D. ([address] [address], [phone], fax [phone])    Again, thank you for allowing me to participate in the care of your patient.      Sincerely,    Spencer Jennings MD

## 2017-01-25 NOTE — Clinical Note
Patient waiting.  Labs on the 1st floor next Tuesday 1/31/17 and again the morning of Thursday 2/2/17.  RTC ~2-3 months to see me (no labs; Monday OK if necessary).

## 2017-01-25 NOTE — MR AVS SNAPSHOT
After Visit Summary   1/25/2017    Laverne Irvin    MRN: [id number]           Patient Information     Date Of Birth          1960        Visit Information        Provider Department      1/25/2017 1:00 PM Specner Jennings MD [facility]        Today's Diagnoses     Chronic fatigue    -  1        Follow-ups after your visit        Follow-up notes from your care team     Return in about 3 months (around 4/25/2017).      Your next 10 appointments already scheduled     Jan 26, 2017  5:00 PM   (Arrive by 4:45 PM)   POSTURAL ORTHOSTATIC SYCOPE with Kehinde De MD   [facility] ([facility])    [address] [place] [address]  3rd Floor  [facility] [address]   [phone]            Jan 31, 2017 11:15 AM   Lab with  LAB   [facility] ([facility])    [address] [place] [address]  [address] [facility] [address]   [phone]            Feb 02, 2017 11:15 AM   Lab with  LAB   [facility] ([facility])    [address] [place] [address]   [phone]            Apr 24, 2017 10:00 AM   (Arrive by 9:45 AM)   Return Visit with Spencer Jennings MD   [facility] ([facility])    [address] [place] [address]  [address] [facility] [address]   [phone]              Future tests that were ordered for you today     Open Future Orders        Priority Expected Expires Ordered    2,3 Dinor 11 Beta Prostaglandin F2 Alpha UR 24 hour Routine 1/31/2017 1/25/2018 1/25/2017    2,3 Dinor 11 Beta Prostaglandin F2 Alpha UR Random Routine 1/31/2017 1/25/2018 1/25/2017    CBC with platelets differential Routine 1/31/2017 1/25/2018 1/25/2017    Chromogranin A Routine 1/31/2017 1/25/2018 1/25/2017    Comprehensive metabolic panel Routine 1/31/2017 1/25/2018 1/25/2017    Heparin Anti-Xa Assay ([facility]): Laboratory Miscellaneous Order  Routine 1/31/2017 1/25/2018 1/25/2017    Heparin Anti-Xa Level High Sensitivity Routine 1/31/2017 1/25/2018 1/25/2017    INR Routine 1/31/2017 1/25/2018 1/25/2017    Histamine Routine 1/31/2017 1/25/2018 1/25/2017    Magnesium Routine 1/31/2017 1/25/2018 1/25/2017    N-Methylhistamine Urine 24 hour Routine 1/31/2017 1/25/2018 1/25/2017    N-Methylhistamine Urine Random Routine 1/31/2017 1/25/2018 1/25/2017    Partial thromboplastin time Routine 1/31/2017 1/25/2018 1/25/2017    Prostaglandin PGD2 Routine 1/31/2017 1/25/2018 1/25/2017    Prostaglandins D2 Urine: 24 hour Routine 1/31/2017 1/25/2018 1/25/2017    Prostaglandins D2 Urine: Random Routine 1/31/2017 1/25/2018 1/25/2017    Tryptase Routine 1/31/2017 1/25/2018 1/25/2017    Leukotriene E4 Urine: 24 hour Routine 1/31/2017 1/25/2018 1/25/2017    Leukotriene E4 Urine: Random Routine 1/31/2017 1/25/2018 1/25/2017    Anti IgE Antibody Routine 1/31/2017 1/25/2018 1/25/2017    Anti IgE Receptor Antibody Routine 1/31/2017 1/25/2018 1/25/2017            Who to contact     If you have questions or need follow up information about today's clinic visit or your schedule please contact [facility] directly at [phone].  Normal or non-critical lab and imaging results will be communicated to you by Appinionshart, letter or phone within 4 business days after the clinic has received the results. If you do not hear from us within 7 days, please contact the clinic through [email or web address] or phone. If you have a critical or abnormal lab result, we will notify you by phone as soon as possible.  Submit refill requests through [employer] or call your pharmacy and they will forward the refill request to us. Please allow 3 business days for your refill to be completed.          Additional Information About Your Visit        Appinionshart Information     [employer] Edge gives you secure access to your electronic health record. If you see a primary care provider, you can also send messages to  "your care team and make appointments. If you have questions, please call your primary care clinic.  If you do not have a primary care provider, please call [phone] and they will assist you.        Care EveryWhere ID     This is your Care EveryWhere ID. This could be used by other organizations to access your [facility] medical records  [id number]        Your Vitals Were     Pulse Temperature Height BMI (Body Mass Index) Pulse Oximetry Breastfeeding?    63 97.8  F (36.6  C) (Tympanic) 1.626 m (5' 4\") 30.45 kg/m2 96% No       Blood Pressure from Last 3 Encounters:   01/25/17 122/68   10/11/15 114/56   12/29/14 125/69    Weight from Last 3 Encounters:   01/25/17 80.513 kg (177 lb 8 oz)   12/29/14 75.8 kg (167 lb 1.7 oz)   10/07/14 57.25 kg (126 lb 3.4 oz)                 Today's Medication Changes          These changes are accurate as of: 1/25/17  2:45 PM.  If you have any questions, ask your nurse or doctor.               These medicines have changed or have updated prescriptions.        Dose/Directions    acyclovir 400 MG tablet   Commonly known as:  ZOVIRAX   This may have changed:    - when to take this  - reasons to take this   Used for:  Chronic fatigue   Changed by:  Spencer Jennings MD        Dose:  400 mg   Take 1 tablet (400 mg) by mouth as needed   Quantity:  30 tablet   Refills:  0       cetirizine 10 MG tablet   Commonly known as:  zyrTEC   This may have changed:    - how much to take  - how to take this  - when to take this  - reasons to take this   Used for:  Chronic fatigue   Changed by:  Spencer Jennings MD        Dose:  10 mg   Take 1 tablet (10 mg) by mouth as needed for allergies   Quantity:  30 tablet   Refills:  0       cholecalciferol 1000 UNIT tablet   Commonly known as:  vitamin D   This may have changed:  when to take this   Used for:  Chronic fatigue   Changed by:  Spencer Jennings MD        Dose:  1000 Units   Take 1 tablet (1,000 Units) by mouth daily   Quantity:  30 tablet "   Refills:  0       ibuprofen 200 MG tablet   Commonly known as:  ADVIL/MOTRIN   This may have changed:    - how much to take  - how to take this  - when to take this   Used for:  Chronic fatigue   Changed by:  Spencer Jennings MD        Dose:  800 mg   Take 4 tablets (800 mg) by mouth 2 times daily   Quantity:  100 tablet   Refills:  0       omeprazole 20 MG CR capsule   Commonly known as:  priLOSEC   This may have changed:    - medication strength  - how much to take  - how to take this  - when to take this   Used for:  Chronic fatigue   Changed by:  Sepncer Jennings MD        Dose:  20 mg   Take 1 capsule (20 mg) by mouth daily   Quantity:  30 capsule   Refills:  0       pseudoePHEDrine 30 MG tablet   Commonly known as:  SUDAFED   This may have changed:    - how much to take  - how to take this  - when to take this   Used for:  Chronic fatigue   Changed by:  Spencer Jennings MD        Dose:  60 mg   Take 2 tablets (60 mg) by mouth 2 times daily   Quantity:  112 tablet   Refills:  0       TiZANidine HCl 2 MG Caps   This may have changed:    - how much to take  - how to take this  - when to take this   Used for:  Chronic fatigue   Changed by:  Spencer Jennings MD        Dose:  2 mg   Take 2 mg by mouth At Bedtime   Quantity:  90 capsule   Refills:  0                Primary Care Provider Office Phone # Fax #    Delmi PARRY Ja [phone]       [facility] [id number] [place] [address]        Thank you!     Thank you for choosing [facility]  for your care. Our goal is always to provide you with excellent care. Hearing back from our patients is one way we can continue to improve our services. Please take a few minutes to complete the written survey that you may receive in the mail after your visit with us. Thank you!             Your Updated Medication List - Protect others around you: Learn how to safely use, store and throw away your medicines at  www.disposemymeds.org.          This list is accurate as of: 1/25/17  2:45 PM.  Always use your most recent med list.                   Brand Name Dispense Instructions for use    acyclovir 400 MG tablet    ZOVIRAX    30 tablet    Take 1 tablet (400 mg) by mouth as needed       cetirizine 10 MG tablet    zyrTEC    30 tablet    Take 1 tablet (10 mg) by mouth as needed for allergies       cholecalciferol 1000 UNIT tablet    vitamin D    30 tablet    Take 1 tablet (1,000 Units) by mouth daily       FLUoxetine 20 MG tablet      Take 60 mg by mouth daily       FLUTICASONE PROPIONATE (NASAL) NA          ibuprofen 200 MG tablet    ADVIL/MOTRIN    100 tablet    Take 4 tablets (800 mg) by mouth 2 times daily       omeprazole 20 MG CR capsule    priLOSEC    30 capsule    Take 1 capsule (20 mg) by mouth daily       oxyCODONE-acetaminophen 5-325 MG per tablet    PERCOCET     Take 1 tablet by mouth every 4 hours as needed for moderate to severe pain       pseudoePHEDrine 30 MG tablet    SUDAFED    112 tablet    Take 2 tablets (60 mg) by mouth 2 times daily       senna-docusate 8.6-50 MG per tablet    SENOKOT-S;PERICOLACE     Take 1-4 tablets by mouth       TiZANidine HCl 2 MG Caps     90 capsule    Take 2 mg by mouth At Bedtime

## 2017-01-25 NOTE — NURSING NOTE
"Laverne Irvin is a 56 year old female who presents for:  Chief Complaint   Patient presents with     Oncology Clinic Visit     mast cell eval        Initial Vitals:  /68 mmHg  Pulse 63  Temp(Src) 97.8  F (36.6  C) (Tympanic)  Ht 1.626 m (5' 4\")  SpO2 96%  Breastfeeding? No Estimated body mass index is 28.67 kg/(m^2) as calculated from the following:    Height as of this encounter: 1.626 m (5' 4\").    Weight as of 12/29/14: 75.8 kg (167 lb 1.7 oz).. There is no weight on file to calculate BSA. BP completed using cuff size: regular  Moderate Pain (4) No LMP recorded. Patient has had a hysterectomy. Allergies and medications reviewed.     Medications: Medication refills not needed today.  Pharmacy name entered into [employer]: [id number] - [place], [place] - [address]    Comments: medication list is correct per patient.    7 minutes for nursing intake (face to face time)   Allison Chaves CMA          "

## 2017-04-17 ENCOUNTER — CARE COORDINATION (OUTPATIENT)
Dept: ONCOLOGY | Facility: CLINIC | Age: 57
End: 2017-04-17

## 2017-04-17 NOTE — PROGRESS NOTES
Called pt this afternoon after realized she (still) needed lab testing for mast cell mediators.  Pt stated she was going to call author as she has not had opportunity to have testing done re: insurance and stopping NSAID's/PPI's.  Pt reports she know has insurance (thru the state) with BCBS and wants to pursue testing at this time.  Reviewed, if safe to do so, pt should not take either NSAID's (including salicylates that can be in self care products and foods) or PPI's for 5 to 7 days prior to appt re: some of the testing that Dr. Jennings may want to perform after seeing pt. Examples of NSAID's are Advil/ibuprofen or Aleve/naproxen and ASA. Examples of PPI's are Prilosec/omeprazole or Protonix/pantoprazole.  Also discussed, if possible, best to wait until pt is having a more symptomatic day.  Pt stated that she has not been feeling well and that her father is in a cardiac ICU at present which adds to challenge of doing testing at this time.  She also reported concern about stopping her omeprazole.  She stated when ever she has stopped it before, her stomach worsened greatly.  Discussed option of focusing on stopping the NSAID's, including salicylates in person care products,  and food.  Stated if she can stop those for 5 to 7 days and thru 24 hour urine collection, could look at just canceling the one test that omeprazole affects (chromogranin A).  Pt will review information on salicylates more and call author when she thinks she wants to schedule lab testing.    Author also noted  staining has not yet been requested.  Author again apologized but stated would request colleague to help with requesting tissue for staining.    Pt ok with plan and will call author when she has reviewed the salicylate information further as well as decided about when can do testing.  Stated she may want to wait until father is out of hospital.    Rn sent message to colleague, Pa, requesting assistance in obtaining tissue for   staining.  Author sent 24 hour urine instructions via e-mail (on MFD), per pt's request during conversation.

## 2017-04-27 ENCOUNTER — TELEPHONE (OUTPATIENT)
Dept: ONCOLOGY | Facility: CLINIC | Age: 57
End: 2017-04-27

## 2017-04-27 NOTE — TELEPHONE ENCOUNTER
2017    To: [facility] (Ph: [phone] and Fax: [phone])    Fr: Dr Jennings, [facility] (Ph: [phone] and Fax: [phone])    Our mutual patient, Laverne Irvin (: 1960) had uterine biopsies collected at your facility on 2008 ([id number]).    We are requesting slides of re-cut, unstained, polyp and non-polyp biopsy tissues; we plan to stain these specimens with  to look for mast cells. Our pathologist would prefer 3 slides of unstained sections (on  sticky  or charged slides) per tissue block. These slides, as well as old slides, should be sent overnight (FedEx acct # [id number]) to:    Dr. Spencer Jennings MD  [facility], [facility] [address]    [place] [address]  Attn: Kayleen Carrington RN    If you have any questions, please feel free to call our nursing line at [phone].    Thank you for your assistance.    Spencer Jennings M.D.  Associate Professor of Medicine  Division of Hematology, Oncology & Transplantation  [facility]  E-mail:  [email or web address]  Office phone:  [phone]  Digital pager:  [phone]    ==========================================================================================================

## 2017-04-27 NOTE — TELEPHONE ENCOUNTER
2017    To: [facility] (Ph: [phone] and Fax: [phone])    Fr: Dr Jennings, [facility] (Ph: [phone] and Fax: [phone])    Our mutual patient, Laverne Irvin (: 1960) had endoscopic procedures (colonoscopy) with biopsies collected at your facility on 2011.    We are requesting slides of re-cut, unstained, polyp and non-polyp biopsy tissues; we plan to stain these specimens with  to look for mast cells. Our pathologist would prefer 3 slides of unstained sections (on  sticky  or charged slides) per tissue block. These slides, as well as old slides, should be sent overnight (FedEx acct # [id number]) to:    Dr. Spencer Jennings MD  [facility], [facility] [address]    [place] [address]  Attn: Kayleen Carrington RN    If you have any questions, please feel free to call our nursing line at [phone].    Thank you for your assistance.    Spencer Jennings M.D.  Associate Professor of Medicine  Division of Hematology, Oncology & Transplantation  [facility]  E-mail:  [email or web address]  Office phone:  [phone]  Digital pager:  [phone]  ===============================================================================================

## 2017-05-09 ENCOUNTER — APPOINTMENT (OUTPATIENT)
Dept: LAB | Facility: CLINIC | Age: 57
End: 2017-05-09
Attending: INTERNAL MEDICINE
Payer: COMMERCIAL

## 2017-05-09 ENCOUNTER — DOCUMENTATION ONLY (OUTPATIENT)
Dept: ONCOLOGY | Facility: CLINIC | Age: 57
End: 2017-05-09

## 2017-05-09 PROCEDURE — 88342 IMHCHEM/IMCYTCHM 1ST ANTB: CPT | Performed by: INTERNAL MEDICINE

## 2017-05-09 NOTE — PROGRESS NOTES
Pathology Block received from [facility]. Paraffin Blocks: 1, [id number]. [facility] requests block be returned to them. Block delivered to pathology lab at [facility], fifth floor.

## 2017-05-09 NOTE — PROGRESS NOTES
Pathology Block received from [facility]. Paraffin Blocks: 1, [id number]. [facility] requests block be returned to them. Block delivered to pathology lab at [facility], fifth floor.

## 2017-05-15 LAB — COPATH REPORT: NORMAL

## 2017-05-18 LAB — COPATH REPORT: NORMAL

## 2017-06-02 ENCOUNTER — CARE COORDINATION (OUTPATIENT)
Dept: ONCOLOGY | Facility: CLINIC | Age: 57
End: 2017-06-02

## 2017-06-02 NOTE — PROGRESS NOTES
"Received message pt left 6/1/17 requesting call back re: planning labs next week but not remembering web site we previously discussed.  Spoke with Laverne today. Previously pt had trouble stopping her omepraxole and ibuprofen for 5 to 7 days prior testing. Stated she realized that she was not helping herself by continuing to postpone testing.  Pt reports she has stopped these medications and believes she can stay off for the 5 to 7 days and thru the 24 hour urine collection.  Reviewed web site salicylatesensitivity.com to use to identify common sources of salicylate exposure in our environment (common self care products and household products) as well as foods naturally high in salicylate.  Specifically discussed coffee, all fruit and vegetable juices, olive oil, and all mints.  Spelled out web site for pt to write down and advised she review it to minimize as much as possible, her exposure to salicylates.  Also discussed since had coffee this morning should look at rescheduling testing to Wednesday of next week and then plan to do 24 hour urine test from Sunday morning to Monday morning.  Also discussed 24 hour urine collection process and transportation.  She stated she would need to recruit help with returning 24 hour urine test as she has lifting restrictions.  Pt aware can call scheduling to reschedule testing after solidifying her help and dates.     Encouraged pt to call [facility] Nurse Line ([phone]) if any questions or concerns.  Called pt back apologizing for not remembering to share information regarding her  stain results.  Informed her that her colonoscopy biopsy did not show elevated number of  count however, her hysterectomy did show elevated number of  stained cells (\"showed up to 55 mast cells per high power field\").  Stated this means we have one piece of evidence to support a diagnosis but that Dr. Jennings needs two pieces of evidence (preferably elevated ) to make " the diagnosis.  Stated this is another reason she needs to get the previously ordered blood and urine testing completed.  Pt appreciated the information.

## 2017-06-06 DIAGNOSIS — R53.82 CHRONIC FATIGUE: ICD-10-CM

## 2017-06-06 LAB
ALBUMIN SERPL-MCNC: 3.7 G/DL (ref 3.4–5)
ALP SERPL-CCNC: 88 U/L (ref 40–150)
ALT SERPL W P-5'-P-CCNC: 13 U/L (ref 0–50)
ANION GAP SERPL CALCULATED.3IONS-SCNC: 5 MMOL/L (ref 3–14)
APTT PPP: 33 SEC (ref 22–37)
AST SERPL W P-5'-P-CCNC: 16 U/L (ref 0–45)
BASOPHILS # BLD AUTO: 0.1 10E9/L (ref 0–0.2)
BASOPHILS NFR BLD AUTO: 0.6 %
BILIRUB SERPL-MCNC: 0.5 MG/DL (ref 0.2–1.3)
BUN SERPL-MCNC: 11 MG/DL (ref 7–30)
CALCIUM SERPL-MCNC: 8.9 MG/DL (ref 8.5–10.1)
CHLORIDE SERPL-SCNC: 106 MMOL/L (ref 94–109)
CO2 SERPL-SCNC: 28 MMOL/L (ref 20–32)
CREAT SERPL-MCNC: 0.73 MG/DL (ref 0.52–1.04)
DIFFERENTIAL METHOD BLD: NORMAL
EOSINOPHIL # BLD AUTO: 0.3 10E9/L (ref 0–0.7)
EOSINOPHIL NFR BLD AUTO: 3.7 %
ERYTHROCYTE [DISTWIDTH] IN BLOOD BY AUTOMATED COUNT: 12.8 % (ref 10–15)
GFR SERPL CREATININE-BSD FRML MDRD: 83 ML/MIN/1.7M2
GLUCOSE SERPL-MCNC: 84 MG/DL (ref 70–99)
HCT VFR BLD AUTO: 43.5 % (ref 35–47)
HGB BLD-MCNC: 14.2 G/DL (ref 11.7–15.7)
IMM GRANULOCYTES # BLD: 0 10E9/L (ref 0–0.4)
IMM GRANULOCYTES NFR BLD: 0.3 %
INR PPP: 0.97 (ref 0.86–1.14)
LYMPHOCYTES # BLD AUTO: 2.5 10E9/L (ref 0.8–5.3)
LYMPHOCYTES NFR BLD AUTO: 31.7 %
MAGNESIUM SERPL-MCNC: 2.2 MG/DL (ref 1.6–2.3)
MCH RBC QN AUTO: 31.3 PG (ref 26.5–33)
MCHC RBC AUTO-ENTMCNC: 32.6 G/DL (ref 31.5–36.5)
MCV RBC AUTO: 96 FL (ref 78–100)
MONOCYTES # BLD AUTO: 0.4 10E9/L (ref 0–1.3)
MONOCYTES NFR BLD AUTO: 5.2 %
NEUTROPHILS # BLD AUTO: 4.6 10E9/L (ref 1.6–8.3)
NEUTROPHILS NFR BLD AUTO: 58.5 %
NRBC # BLD AUTO: 0 10*3/UL
NRBC BLD AUTO-RTO: 0 /100
PLATELET # BLD AUTO: 209 10E9/L (ref 150–450)
POTASSIUM SERPL-SCNC: 4.4 MMOL/L (ref 3.4–5.3)
PROT SERPL-MCNC: 7 G/DL (ref 6.8–8.8)
RBC # BLD AUTO: 4.53 10E12/L (ref 3.8–5.2)
SODIUM SERPL-SCNC: 139 MMOL/L (ref 133–144)
WBC # BLD AUTO: 7.9 10E9/L (ref 4–11)

## 2017-06-06 PROCEDURE — 85520 HEPARIN ASSAY: CPT | Performed by: INTERNAL MEDICINE

## 2017-06-07 LAB — UFH PPP CHRO-ACNC: <0.06 U/ML

## 2017-06-08 DIAGNOSIS — R53.82 CHRONIC FATIGUE: ICD-10-CM

## 2017-06-08 LAB
CGA SERPL-MCNC: 82 NG/ML
TRYPTASE SERPL-MCNC: 5.5 NG/ML

## 2017-06-09 LAB
ANTI IGE ANTIBODY: NORMAL
COLLECT DURATION TIME UR: NORMAL H
CREAT UR-MCNC: 128 MG/DL
LAB SCANNED RESULT: NORMAL
ME-HISTAMINE/CREAT 24H UR: 112
SPECIMEN VOL 24H UR: NORMAL L

## 2017-06-10 LAB — LEUKOTRIENE E4 URINE: 163

## 2017-06-11 LAB — HISTAMINE SERPL-SCNC: NORMAL NMOL/L

## 2017-06-13 LAB — 2,3 11B PROSTAGLANDIN F2A UR: 5200

## 2017-06-14 LAB
COLLECT DURATION TIME UR: 24 H
CREAT UR-MCNC: 133 MG/DL
LEUKOTRIENE E4 URINE: 122
ME-HISTAMINE/CREAT 24H UR: 116
SPECIMEN VOL 24H UR: 1340 L

## 2017-06-15 LAB
CIU ASSOCIATED BASOPHIL ACTIVATION: 2
IGE RECEP AB COMMENT: NORMAL

## 2017-06-16 LAB
2,3 11B PROSTAGLANDIN F2A UR: 5463
COLLECT DURATION TIME UR: 24 H
COLLECT DURATION TIME UR: NORMAL H
PROSTAGLANDIN D2 24H UR-MRATE: 330
PROSTAGLANDIN D2 24H UR-MRATE: NORMAL
PROSTAGLANDIN D2: 67
SPECIMEN VOL 24H UR: 1340 L
SPECIMEN VOL 24H UR: NORMAL L

## 2017-07-01 ENCOUNTER — HEALTH MAINTENANCE LETTER (OUTPATIENT)
Age: 57
End: 2017-07-01

## 2019-10-03 ENCOUNTER — HEALTH MAINTENANCE LETTER (OUTPATIENT)
Age: 59
End: 2019-10-03

## 2020-11-07 ENCOUNTER — HEALTH MAINTENANCE LETTER (OUTPATIENT)
Age: 60
End: 2020-11-07

## 2021-09-05 ENCOUNTER — HEALTH MAINTENANCE LETTER (OUTPATIENT)
Age: 61
End: 2021-09-05

## 2021-10-31 ENCOUNTER — HEALTH MAINTENANCE LETTER (OUTPATIENT)
Age: 61
End: 2021-10-31

## 2021-12-26 ENCOUNTER — HEALTH MAINTENANCE LETTER (OUTPATIENT)
Age: 61
End: 2021-12-26

## 2022-10-23 ENCOUNTER — HEALTH MAINTENANCE LETTER (OUTPATIENT)
Age: 62
End: 2022-10-23

## 2023-04-02 ENCOUNTER — HEALTH MAINTENANCE LETTER (OUTPATIENT)
Age: 63
End: 2023-04-02

## 2023-11-05 ENCOUNTER — HEALTH MAINTENANCE LETTER (OUTPATIENT)
Age: 63
End: 2023-11-05